# Patient Record
Sex: FEMALE | Race: WHITE | NOT HISPANIC OR LATINO | Employment: OTHER | URBAN - METROPOLITAN AREA
[De-identification: names, ages, dates, MRNs, and addresses within clinical notes are randomized per-mention and may not be internally consistent; named-entity substitution may affect disease eponyms.]

---

## 2017-09-05 ENCOUNTER — TELEPHONE (OUTPATIENT)
Dept: NEUROSURGERY | Facility: CLINIC | Age: 62
End: 2017-09-05

## 2017-09-05 NOTE — TELEPHONE ENCOUNTER
Patient had a fall and complains of hand numbness.  She as not been seen in out office since 2016. I told her that the soonest we could get her in was early October. I advise her to see her PCP to get imaging.     She would like a call from you about this.     [phone]

## 2017-09-05 NOTE — TELEPHONE ENCOUNTER
"Patient called back again and requested an appointment with Dr. Ascencio, I said well you just saw Dr. Linn last year the physician's do not typically switch back and forth with their patients. I told her that her message was sent to our manager who she requested to speak with. She said ok. She is upset that we do not have any appointments. She said this is an emergency, I advised her if she is that concerned with her pain and numbness she needs to either contact her PCP, go to an urgent care or ER for an evaluation. Her response was \" What good are they going to do? Just order imaging? I advised they can see her and do an evaluation and then she can follow up with Dr. Linn with the imaging that they order so that Dr. Linn is able to give her more advise based on her images or scans. She finally said ok and hung up.  "

## 2017-11-10 ENCOUNTER — TELEPHONE (OUTPATIENT)
Dept: NEUROSURGERY | Facility: CLINIC | Age: 62
End: 2017-11-10

## 2017-11-10 NOTE — TELEPHONE ENCOUNTER
Called this patient because has already seen Dr. Linn on 9/28/2016 however she is now scheduled with Dr. Ascencio on 11/14/2017 as a new patient and she should not be scheduled with both physician's. Per Manav the patient is to be cancelled and can be rescheduled with Dr. Linn. We also have a telephone conversation from 9/5/2017 explaining the same stuff basically that she can't see both physicians. But then somehow either she or her physicians office called back on 9/19/2017 and she was scheduled with Dr. Ascencio. The one phone number we had listed as a home was missing or invalid and the other number we had as a mobile was the wrong number. I called her daughter and was given her new cell phone number. I have left the patient a voicemail and her appt. For Tuesday 11/14/2017 with Dr. Ascencio has been cancelled.

## 2017-12-12 ENCOUNTER — APPOINTMENT (OUTPATIENT)
Dept: WOMENS IMAGING | Facility: HOSPITAL | Age: 62
End: 2017-12-12

## 2017-12-12 PROCEDURE — 77067 SCR MAMMO BI INCL CAD: CPT | Performed by: RADIOLOGY

## 2017-12-12 PROCEDURE — 77063 BREAST TOMOSYNTHESIS BI: CPT | Performed by: RADIOLOGY

## 2018-12-14 ENCOUNTER — APPOINTMENT (OUTPATIENT)
Dept: WOMENS IMAGING | Facility: HOSPITAL | Age: 63
End: 2018-12-14

## 2018-12-14 PROCEDURE — 77063 BREAST TOMOSYNTHESIS BI: CPT | Performed by: RADIOLOGY

## 2018-12-14 PROCEDURE — 77067 SCR MAMMO BI INCL CAD: CPT | Performed by: RADIOLOGY

## 2019-12-17 ENCOUNTER — APPOINTMENT (OUTPATIENT)
Dept: WOMENS IMAGING | Facility: HOSPITAL | Age: 64
End: 2019-12-17

## 2019-12-17 PROCEDURE — 77063 BREAST TOMOSYNTHESIS BI: CPT | Performed by: RADIOLOGY

## 2019-12-17 PROCEDURE — 77067 SCR MAMMO BI INCL CAD: CPT | Performed by: RADIOLOGY

## 2020-12-22 ENCOUNTER — APPOINTMENT (OUTPATIENT)
Dept: WOMENS IMAGING | Facility: HOSPITAL | Age: 65
End: 2020-12-22

## 2020-12-22 PROCEDURE — 77063 BREAST TOMOSYNTHESIS BI: CPT | Performed by: RADIOLOGY

## 2020-12-22 PROCEDURE — 77067 SCR MAMMO BI INCL CAD: CPT | Performed by: RADIOLOGY

## 2021-12-28 ENCOUNTER — APPOINTMENT (OUTPATIENT)
Dept: WOMENS IMAGING | Facility: HOSPITAL | Age: 66
End: 2021-12-28

## 2021-12-28 PROCEDURE — 77063 BREAST TOMOSYNTHESIS BI: CPT | Performed by: RADIOLOGY

## 2021-12-28 PROCEDURE — 77067 SCR MAMMO BI INCL CAD: CPT | Performed by: RADIOLOGY

## 2022-01-30 PROBLEM — R05.9 COUGH: Status: ACTIVE | Noted: 2022-01-30

## 2022-06-29 ENCOUNTER — PREP FOR SURGERY (OUTPATIENT)
Dept: SURGERY | Facility: SURGERY CENTER | Age: 67
End: 2022-06-29

## 2022-06-29 ENCOUNTER — OFFICE VISIT (OUTPATIENT)
Dept: GASTROENTEROLOGY | Facility: CLINIC | Age: 67
End: 2022-06-29

## 2022-06-29 DIAGNOSIS — K76.0 FATTY LIVER: ICD-10-CM

## 2022-06-29 DIAGNOSIS — Z12.11 ENCOUNTER FOR SCREENING FOR MALIGNANT NEOPLASM OF COLON: ICD-10-CM

## 2022-06-29 DIAGNOSIS — K21.9 GASTROESOPHAGEAL REFLUX DISEASE, UNSPECIFIED WHETHER ESOPHAGITIS PRESENT: ICD-10-CM

## 2022-06-29 DIAGNOSIS — R05.9 COUGH: Primary | ICD-10-CM

## 2022-06-29 DIAGNOSIS — R05.9 COUGH: ICD-10-CM

## 2022-06-29 DIAGNOSIS — R19.7 DIARRHEA, UNSPECIFIED TYPE: Primary | ICD-10-CM

## 2022-06-29 DIAGNOSIS — R19.7 DIARRHEA, UNSPECIFIED TYPE: ICD-10-CM

## 2022-06-29 PROCEDURE — 99204 OFFICE O/P NEW MOD 45 MIN: CPT | Performed by: INTERNAL MEDICINE

## 2022-06-29 RX ORDER — TRIAMCINOLONE ACETONIDE 1 MG/G
CREAM TOPICAL
COMMUNITY
Start: 2022-06-24

## 2022-06-29 RX ORDER — SACCHAROMYCES BOULARDII 250 MG
250 CAPSULE ORAL DAILY
COMMUNITY

## 2022-06-29 RX ORDER — SODIUM CHLORIDE, SODIUM LACTATE, POTASSIUM CHLORIDE, CALCIUM CHLORIDE 600; 310; 30; 20 MG/100ML; MG/100ML; MG/100ML; MG/100ML
30 INJECTION, SOLUTION INTRAVENOUS CONTINUOUS PRN
Status: CANCELLED | OUTPATIENT
Start: 2022-06-29

## 2022-06-29 RX ORDER — ROSUVASTATIN CALCIUM 10 MG/1
10 TABLET, COATED ORAL DAILY
COMMUNITY
Start: 2022-04-13

## 2022-06-29 RX ORDER — MAGNESIUM CARB/ALUMINUM HYDROX 105-160MG
TABLET,CHEWABLE ORAL
COMMUNITY
Start: 2022-05-16

## 2022-06-29 RX ORDER — SODIUM CHLORIDE 0.9 % (FLUSH) 0.9 %
3 SYRINGE (ML) INJECTION EVERY 12 HOURS SCHEDULED
Status: CANCELLED | OUTPATIENT
Start: 2022-06-29

## 2022-06-29 RX ORDER — LEVOTHYROXINE SODIUM 112 UG/1
112 TABLET ORAL EVERY MORNING
COMMUNITY
Start: 2022-06-03

## 2022-06-29 RX ORDER — ALPRAZOLAM 0.25 MG/1
TABLET ORAL
COMMUNITY
Start: 2022-05-24

## 2022-06-29 RX ORDER — TURMERIC ROOT EXTRACT 500 MG
500 TABLET ORAL DAILY
COMMUNITY

## 2022-06-29 RX ORDER — PANTOPRAZOLE SODIUM 40 MG/1
40 TABLET, DELAYED RELEASE ORAL DAILY
COMMUNITY
Start: 2022-04-22

## 2022-06-29 RX ORDER — DESVENLAFAXINE 100 MG/1
50 TABLET, EXTENDED RELEASE ORAL DAILY
COMMUNITY

## 2022-06-29 RX ORDER — TRAMADOL HYDROCHLORIDE 50 MG/1
50 TABLET ORAL EVERY 6 HOURS PRN
Status: ON HOLD | COMMUNITY
Start: 2022-05-27 | End: 2022-10-11

## 2022-06-29 RX ORDER — SODIUM CHLORIDE 0.9 % (FLUSH) 0.9 %
10 SYRINGE (ML) INJECTION AS NEEDED
Status: CANCELLED | OUTPATIENT
Start: 2022-06-29

## 2022-06-29 RX ORDER — LEVOCETIRIZINE DIHYDROCHLORIDE 5 MG/1
5 TABLET, FILM COATED ORAL DAILY
COMMUNITY
Start: 2022-02-02

## 2022-06-29 RX ORDER — VITAMIN E 268 MG
1 CAPSULE ORAL DAILY
COMMUNITY

## 2022-06-29 NOTE — PROGRESS NOTES
"Chief Complaint   Patient presents with   • Diarrhea           History of Present Illness  Patient is a 67 year old female who presents to the office as a new patient for evaluation.  She has a history of heartburn and diarrhea.     Reports last colonoscopy was \"years\" ago and revealed internal hemorrhoids.     She was prescribed pantoprazole 40 mg po once daily and describes heart burn as well controlled. States that at this time she also started taking Xyzal for a persistent cough that has since resolved.     At today's visit she reports that she has began taking the pantoprazole as needed with noticeable return in dry cough.     She presents to today for a one year history of diarrhea without melena or hematochezia.  Describes stool consistency as liquid and yellow in color.    Denies prior stool testing.           Review of Systems     Result Review :       US Gallbladder (02/01/2022 10:01)  LIPID PANEL (01/06/2022 14:17)  COMPREHENSIVE METABOLIC PANEL (01/06/2022 14:17)    Vital Signs:   There were no vitals taken for this visit.    There is no height or weight on file to calculate BMI.     Physical Exam  Vitals reviewed.   Constitutional:       Appearance: Normal appearance.   HENT:      Nose: No nasal deformity.   Eyes:      General: No scleral icterus.  Neck:      Comments: Trachea midline.  Cardiovascular:      Rate and Rhythm: Normal rate and regular rhythm.   Pulmonary:      Effort: No respiratory distress.      Breath sounds: Normal breath sounds.   Abdominal:      General: Bowel sounds are normal. There is no distension.      Palpations: Abdomen is soft. There is no mass.      Tenderness: There is no abdominal tenderness.   Lymphadenopathy:      Comments: No periumbilical lymphadenopathy.   Skin:     General: Skin is warm.   Neurological:      Mental Status: She is alert.           Assessment and Plan    Diagnoses and all orders for this visit:    1. Cough (Primary)    2. Fatty liver    3. Diarrhea, " unspecified type             I have reviewed and confirmed the accuracy of the HPI and Assessment and Plan as documented by the APRN GERALDINE Abraham        Follow Up   Return if symptoms worsen or fail to improve, for at procedure.    Patient Instructions   Continue taking Probiotic     Restart for Xyzal once daily for 14 days, then you May restart pantoprazole 40 mg once daily to help with determining if cough is related to acid reflux verses seasonal allergies     Blood work today to assess liver function     Once we receive these results, our office will contact you to discuss updating your treatment plan as indicated       Stool Testing for Diarrhea:    1. We will check stool studies to assess for any infectious etiology that could be contributing to your symptoms.   2. Stool Specimens kit will be given today by the lab  If you cannot return stool specimens to lab within 24 hours, place specimen in provided bag with kit and keep in refrigerator until you are able to drop off specimen  Our office will contact you once we receive these results to discuss updating treatment plan as indicated     To better evaluate diarrhea and cough/possible acid reflux:     Schedule EGD and colonoscopy, orders placed

## 2022-06-29 NOTE — PATIENT INSTRUCTIONS
Continue taking Probiotic     Restart for Xyzal once daily for 14 days, then you May restart pantoprazole 40 mg once daily to help with determining if cough is related to acid reflux verses seasonal allergies     Blood work today to assess liver function     Once we receive these results, our office will contact you to discuss updating your treatment plan as indicated       Stool Testing for Diarrhea:    We will check stool studies to assess for any infectious etiology that could be contributing to your symptoms.   Stool Specimens kit will be given today by the lab  If you cannot return stool specimens to lab within 24 hours, place specimen in provided bag with kit and keep in refrigerator until you are able to drop off specimen  Our office will contact you once we receive these results to discuss updating treatment plan as indicated     To better evaluate diarrhea and cough/possible acid reflux:     Schedule EGD and colonoscopy, orders placed

## 2022-06-30 LAB
ALBUMIN SERPL-MCNC: 5 G/DL (ref 3.8–4.8)
ALBUMIN/GLOB SERPL: 2.1 {RATIO} (ref 1.2–2.2)
ALP SERPL-CCNC: 126 IU/L (ref 44–121)
ALT SERPL-CCNC: 32 IU/L (ref 0–32)
AST SERPL-CCNC: 22 IU/L (ref 0–40)
BILIRUB SERPL-MCNC: 0.4 MG/DL (ref 0–1.2)
BUN SERPL-MCNC: 13 MG/DL (ref 8–27)
BUN/CREAT SERPL: 18 (ref 12–28)
CALCIUM SERPL-MCNC: 9.8 MG/DL (ref 8.7–10.3)
CHLORIDE SERPL-SCNC: 105 MMOL/L (ref 96–106)
CO2 SERPL-SCNC: 22 MMOL/L (ref 20–29)
CREAT SERPL-MCNC: 0.72 MG/DL (ref 0.57–1)
EGFRCR SERPLBLD CKD-EPI 2021: 91 ML/MIN/1.73
GLOBULIN SER CALC-MCNC: 2.4 G/DL (ref 1.5–4.5)
GLUCOSE SERPL-MCNC: 108 MG/DL (ref 65–99)
POTASSIUM SERPL-SCNC: 4.7 MMOL/L (ref 3.5–5.2)
PROT SERPL-MCNC: 7.4 G/DL (ref 6–8.5)
SODIUM SERPL-SCNC: 142 MMOL/L (ref 134–144)

## 2022-07-11 DIAGNOSIS — R79.89 ABNORMAL LIVER FUNCTION TESTS: Primary | ICD-10-CM

## 2022-07-11 LAB
ALP BONE CFR SERPL: 31 % (ref 14–68)
ALP INTEST CFR SERPL: 0 % (ref 0–18)
ALP LIVER CFR SERPL: 69 % (ref 18–85)
ALP SERPL-CCNC: 123 IU/L (ref 44–121)
THYROPEROXIDASE AB SERPL-ACNC: <8 IU/ML (ref 0–34)
WRITTEN AUTHORIZATION: NORMAL

## 2022-07-12 ENCOUNTER — LAB (OUTPATIENT)
Dept: LAB | Facility: HOSPITAL | Age: 67
End: 2022-07-12

## 2022-07-12 DIAGNOSIS — R79.89 ABNORMAL LIVER FUNCTION TESTS: ICD-10-CM

## 2022-07-12 LAB
BACTERIA SPEC CULT: NORMAL
BACTERIA SPEC CULT: NORMAL
C DIFF TOX GENS STL QL NAA+PROBE: NEGATIVE
CAMPYLOBACTER STL CULT: NORMAL
E COLI SXT STL QL IA: NEGATIVE
O+P SPEC MICRO: NORMAL
O+P STL CONC: NORMAL
SALM + SHIG STL CULT: NORMAL
SPECIMEN STATUS: NORMAL

## 2022-07-12 PROCEDURE — 36415 COLL VENOUS BLD VENIPUNCTURE: CPT

## 2022-07-12 PROCEDURE — 86376 MICROSOMAL ANTIBODY EACH: CPT

## 2022-07-13 LAB — LKM-1 AB SER-ACNC: 1.1 UNITS (ref 0–20)

## 2022-10-11 ENCOUNTER — ANESTHESIA EVENT (OUTPATIENT)
Dept: SURGERY | Facility: SURGERY CENTER | Age: 67
End: 2022-10-11

## 2022-10-11 ENCOUNTER — ANESTHESIA (OUTPATIENT)
Dept: SURGERY | Facility: SURGERY CENTER | Age: 67
End: 2022-10-11

## 2022-10-11 ENCOUNTER — HOSPITAL ENCOUNTER (OUTPATIENT)
Facility: SURGERY CENTER | Age: 67
Setting detail: HOSPITAL OUTPATIENT SURGERY
Discharge: HOME | End: 2022-10-11
Attending: INTERNAL MEDICINE | Admitting: INTERNAL MEDICINE

## 2022-10-11 VITALS
HEART RATE: 73 BPM | HEIGHT: 66 IN | OXYGEN SATURATION: 95 % | BODY MASS INDEX: 26.36 KG/M2 | RESPIRATION RATE: 17 BRPM | TEMPERATURE: 97.5 F | WEIGHT: 164 LBS | SYSTOLIC BLOOD PRESSURE: 138 MMHG | DIASTOLIC BLOOD PRESSURE: 91 MMHG

## 2022-10-11 DIAGNOSIS — K21.9 GASTROESOPHAGEAL REFLUX DISEASE, UNSPECIFIED WHETHER ESOPHAGITIS PRESENT: ICD-10-CM

## 2022-10-11 DIAGNOSIS — Z12.11 ENCOUNTER FOR SCREENING FOR MALIGNANT NEOPLASM OF COLON: ICD-10-CM

## 2022-10-11 DIAGNOSIS — R05.9 COUGH: ICD-10-CM

## 2022-10-11 DIAGNOSIS — R19.7 DIARRHEA, UNSPECIFIED TYPE: ICD-10-CM

## 2022-10-11 PROCEDURE — 87081 CULTURE SCREEN ONLY: CPT | Performed by: INTERNAL MEDICINE

## 2022-10-11 PROCEDURE — 25010000002 PROPOFOL 10 MG/ML EMULSION: Performed by: STUDENT IN AN ORGANIZED HEALTH CARE EDUCATION/TRAINING PROGRAM

## 2022-10-11 PROCEDURE — 43239 EGD BIOPSY SINGLE/MULTIPLE: CPT | Performed by: INTERNAL MEDICINE

## 2022-10-11 PROCEDURE — 45385 COLONOSCOPY W/LESION REMOVAL: CPT | Performed by: INTERNAL MEDICINE

## 2022-10-11 PROCEDURE — 88305 TISSUE EXAM BY PATHOLOGIST: CPT | Performed by: INTERNAL MEDICINE

## 2022-10-11 PROCEDURE — 45380 COLONOSCOPY AND BIOPSY: CPT | Performed by: INTERNAL MEDICINE

## 2022-10-11 RX ORDER — SUCRALFATE 1 G/1
1 TABLET ORAL 2 TIMES DAILY
Qty: 60 TABLET | Refills: 1 | Status: SHIPPED | OUTPATIENT
Start: 2022-10-11 | End: 2023-01-30

## 2022-10-11 RX ORDER — SODIUM CHLORIDE, SODIUM LACTATE, POTASSIUM CHLORIDE, CALCIUM CHLORIDE 600; 310; 30; 20 MG/100ML; MG/100ML; MG/100ML; MG/100ML
30 INJECTION, SOLUTION INTRAVENOUS CONTINUOUS PRN
Status: DISCONTINUED | OUTPATIENT
Start: 2022-10-11 | End: 2022-10-11 | Stop reason: HOSPADM

## 2022-10-11 RX ORDER — SODIUM CHLORIDE 0.9 % (FLUSH) 0.9 %
3 SYRINGE (ML) INJECTION EVERY 12 HOURS SCHEDULED
Status: DISCONTINUED | OUTPATIENT
Start: 2022-10-11 | End: 2022-10-11 | Stop reason: HOSPADM

## 2022-10-11 RX ORDER — MAGNESIUM HYDROXIDE 1200 MG/15ML
LIQUID ORAL AS NEEDED
Status: DISCONTINUED | OUTPATIENT
Start: 2022-10-11 | End: 2022-10-11 | Stop reason: HOSPADM

## 2022-10-11 RX ORDER — SODIUM CHLORIDE 0.9 % (FLUSH) 0.9 %
10 SYRINGE (ML) INJECTION AS NEEDED
Status: DISCONTINUED | OUTPATIENT
Start: 2022-10-11 | End: 2022-10-11 | Stop reason: HOSPADM

## 2022-10-11 RX ORDER — PROPOFOL 10 MG/ML
VIAL (ML) INTRAVENOUS AS NEEDED
Status: DISCONTINUED | OUTPATIENT
Start: 2022-10-11 | End: 2022-10-11 | Stop reason: SURG

## 2022-10-11 RX ORDER — LIDOCAINE HYDROCHLORIDE 20 MG/ML
INJECTION, SOLUTION INFILTRATION; PERINEURAL AS NEEDED
Status: DISCONTINUED | OUTPATIENT
Start: 2022-10-11 | End: 2022-10-11 | Stop reason: SURG

## 2022-10-11 RX ADMIN — PROPOFOL INJECTABLE EMULSION 120 MG: 10 INJECTION, EMULSION INTRAVENOUS at 10:24

## 2022-10-11 RX ADMIN — LIDOCAINE HYDROCHLORIDE 60 MG: 20 INJECTION, SOLUTION INFILTRATION; PERINEURAL at 10:24

## 2022-10-11 RX ADMIN — PROPOFOL INJECTABLE EMULSION 180 MCG/KG/MIN: 10 INJECTION, EMULSION INTRAVENOUS at 10:25

## 2022-10-11 RX ADMIN — SODIUM CHLORIDE, POTASSIUM CHLORIDE, SODIUM LACTATE AND CALCIUM CHLORIDE 30 ML/HR: 600; 310; 30; 20 INJECTION, SOLUTION INTRAVENOUS at 10:01

## 2022-10-11 NOTE — H&P
No chief complaint on file.      HPI  Diarrhea  gerd  Cough  Rectal bleeding           Problem List:    Patient Active Problem List   Diagnosis   • Degeneration of intervertebral disc of cervical region   • Bilateral occipital neuralgia   • Cough   • Diarrhea   • Gastroesophageal reflux disease   • Encounter for screening for malignant neoplasm of colon       Medical History:    Past Medical History:   Diagnosis Date   • Depression    • Graves disease    • Hyperlipidemia    • Insomnia         Social History:    Social History     Socioeconomic History   • Marital status:    • Number of children: 3   • Years of education: some college   Tobacco Use   • Smoking status: Never   • Smokeless tobacco: Never   Vaping Use   • Vaping Use: Never used   Substance and Sexual Activity   • Alcohol use: Yes     Comment: 1-2 per week   • Drug use: No   • Sexual activity: Defer     Birth control/protection: Post-menopausal       Family History:   Family History   Problem Relation Age of Onset   • Stroke Mother    • Cancer Father    • Cancer Other    • Colon cancer Neg Hx    • Colon polyps Neg Hx    • Crohn's disease Neg Hx    • Irritable bowel syndrome Neg Hx    • Ulcerative colitis Neg Hx        Surgical History:   Past Surgical History:   Procedure Laterality Date   • COLONOSCOPY     • KNEE SURGERY     • NECK SURGERY     • UPPER GASTROINTESTINAL ENDOSCOPY         No current facility-administered medications for this encounter.    Current Outpatient Medications:   •  acetaminophen-codeine (TYLENOL #3) 300-30 MG per tablet, Take 1 tablet by mouth Every 4 (Four) Hours As Needed for Moderate Pain ., Disp: , Rfl:   •  albuterol (PROVENTIL HFA;VENTOLIN HFA) 108 (90 Base) MCG/ACT inhaler, Inhale 2 puffs Every 6 (Six) Hours As Needed for Wheezing or Shortness of Air., Disp: 1 inhaler, Rfl: 0  •  ALPRAZolam (XANAX) 0.25 MG tablet, , Disp: , Rfl:   •  benzonatate (TESSALON) 100 MG capsule, Take 2 capsules by mouth 3 (Three) Times a  Day As Needed for Cough., Disp: 10 capsule, Rfl: 0  •  benzonatate (TESSALON) 200 MG capsule, Take 1 capsule by mouth 3 (Three) Times a Day As Needed for Cough., Disp: 30 capsule, Rfl: 0  •  Biotin 10 MG capsule, Take 10 mg by mouth Daily., Disp: , Rfl:   •  Chlorcyclizine-Pseudoephed (STAHIST AD) 25-60 MG tablet, 1 tablet 3 times a day as needed for congestion, Disp: 30 tablet, Rfl: 0  •  Cholecalciferol (VITAMIN D) 2000 UNITS capsule, Take  by mouth., Disp: , Rfl:   •  Cholecalciferol 50 MCG (2000 UT) tablet, Take 2,000 Units by mouth Daily., Disp: , Rfl:   •  desvenlafaxine (PRISTIQ) 100 MG 24 hr tablet, Take 50 mg by mouth Daily., Disp: , Rfl:   •  fluticasone (FLONASE) 50 MCG/ACT nasal spray, 2 sprays into the nostril(s) as directed by provider Daily., Disp: 1 bottle, Rfl: 0  •  gabapentin (NEURONTIN) 300 MG capsule, , Disp: , Rfl:   •  Glucosamine-Chondroit-Vit C-Mn (GLUCOSAMINE-CHONDROITIN) tablet, Take  by mouth., Disp: , Rfl:   •  levocetirizine (XYZAL) 5 MG tablet, Take 5 mg by mouth Daily., Disp: , Rfl:   •  levothyroxine (SYNTHROID, LEVOTHROID) 112 MCG tablet, Take 112 mcg by mouth Every Morning., Disp: , Rfl:   •  levothyroxine sodium (TIROSINT) 125 MCG capsule capsule, Take  by mouth., Disp: , Rfl:   •  magnesium citrate 1.745 GM/30ML solution solution, , Disp: , Rfl:   •  MethylPREDNISolone (MEDROL, PORSCHE,) 4 MG tablet, follow package directions, Disp: 21 tablet, Rfl: 0  •  Multiple Vitamins-Minerals (PX MENS MULTIVITAMINS) tablet, Take  by mouth., Disp: , Rfl:   •  niacin 500 MG tablet, Take  by mouth., Disp: , Rfl:   •  pantoprazole (PROTONIX) 40 MG EC tablet, Take 40 mg by mouth Daily., Disp: , Rfl:   •  REXULTI 4 MG tablet, Take  by mouth Daily., Disp: , Rfl: 2  •  rosuvastatin (CRESTOR) 10 MG tablet, Take 10 mg by mouth Daily., Disp: , Rfl:   •  saccharomyces boulardii (FLORASTOR) 250 MG capsule, Take 250 mg by mouth Daily., Disp: , Rfl:   •  tobramycin 0.3 % solution ophthalmic solution, Use 2  drops in affected eye every two hours the first day then 2 drops 4 x's a day for 6 days., Disp: 1 bottle, Rfl: 0  •  traMADol (ULTRAM) 50 MG tablet, Take 50 mg by mouth Every 6 (Six) Hours As Needed., Disp: , Rfl:   •  triamcinolone (KENALOG) 0.1 % cream, , Disp: , Rfl:   •  Turmeric 500 MG tablet, Take 500 mg by mouth Daily., Disp: , Rfl:   •  vitamin E 400 UNIT capsule, Take 1 capsule by mouth Daily., Disp: , Rfl:   •  Vitamin E 400 UNITS tablet, Take  by mouth., Disp: , Rfl:   •  zolpidem (AMBIEN) 10 MG tablet, Take  by mouth., Disp: , Rfl:     Allergies:   Allergies   Allergen Reactions   • Codeine Itching, Nausea And Vomiting and GI Intolerance        The following portions of the patient's history were reviewed by me and updated as appropriate: review of systems, allergies, current medications, past family history, past medical history, past social history, past surgical history and problem list.    There were no vitals filed for this visit.    PHYSICAL EXAM:    CONSTITUTIONAL:  today's vital signs reviewed by me  GASTROINTESTINAL: abdomen is soft nontender nondistended with normal active bowel sounds, no masses are appreciated    Assessment/ Plan  Diarrhea  gerd  Cough    egd and colonoscopy    Risks and benefits as well as alternatives to endoscopic evaluation were explained to the patient and they voiced understanding and wish to proceed.  These risks include but are not limited to the risk of bleeding, perforation, adverse reaction to sedation, and missed lesions.  The patient was given the opportunity to ask questions prior to the endoscopic procedure.

## 2022-10-11 NOTE — ANESTHESIA POSTPROCEDURE EVALUATION
Patient: Marga Holcomb    Procedure Summary     Date: 10/11/22 Room / Location: [facility]  / [facility]    Anesthesia Start: 1019 Anesthesia Stop: 1102    Procedures:       ESOPHAGOGASTRODUODENOSCOPY      COLONOSCOPY FOR SCREENING Diagnosis:       Diarrhea, unspecified type      Cough      Gastroesophageal reflux disease, unspecified whether esophagitis present      Encounter for screening for malignant neoplasm of colon      (Diarrhea, unspecified type [R19.7])      (Cough [R05.9])      (Gastroesophageal reflux disease, unspecified whether esophagitis present [K21.9])      (Encounter for screening for malignant neoplasm of colon [Z12.11])    Surgeons: Malik Rowe MD Provider: Luis Enrique Sears MD    Anesthesia Type: MAC ASA Status: 2          Anesthesia Type: MAC    Vitals  Vitals Value Taken Time   /91 10/11/22 1122   Temp 36.4 °C (97.5 °F) 10/11/22 1106   Pulse 73 10/11/22 1120   Resp 18 10/11/22 1106   SpO2 95 % 10/11/22 1122   Vitals shown include unvalidated device data.        Post Anesthesia Care and Evaluation    Patient location during evaluation: bedside  Patient participation: complete - patient participated  Level of consciousness: awake and alert  Pain management: adequate    Airway patency: patent  Anesthetic complications: No anesthetic complications  PONV Status: controlled  Cardiovascular status: blood pressure returned to baseline and acceptable  Respiratory status: acceptable  Hydration status: acceptable

## 2022-10-11 NOTE — DISCHARGE INSTRUCTIONS
Monitored Anesthesia Care, Care After  These instructions provide you with information about caring for yourself after your procedure. Your health care provider may also give you more specific instructions. Your treatment has been planned according to current medical practices, but problems sometimes occur. Call your health care provider if you have any problems or questions after your procedure.  What can I expect after the procedure?  After your procedure, you may:  Feel sleepy for several hours.  Feel clumsy and have poor balance for several hours.  Feel forgetful about what happened after the procedure.  Have poor judgment for several hours.  Feel nauseous or vomit.  Have a sore throat if you had a breathing tube during the procedure.  Follow these instructions at home:    *****************NO DRIVING TODAY****************************    For at least 24 hours after the procedure:             Have a responsible adult stay with you. It is important to have someone help care for you until you are awake and alert.  Rest as needed.  Do not:  Participate in activities in which you could fall or become injured.  Use heavy machinery.  Drink alcohol.  Take sleeping pills or medicines that cause drowsiness.  Make important decisions or sign legal documents.  Take care of children on your own.  Eating and drinking  Follow the diet that is recommended by your health care provider.  If you vomit, drink water, juice, or soup when you can drink without vomiting.  Make sure you have little or no nausea before eating solid foods.  General instructions  Take over-the-counter and prescription medicines only as told by your health care provider.  If you have sleep apnea, surgery and certain medicines can increase your risk for breathing problems. Follow instructions from your health care provider about wearing your sleep device:  Anytime you are sleeping, including during daytime naps.  While taking prescription pain medicines,  sleeping medicines, or medicines that make you drowsy.  If you smoke, do not smoke without supervision.  Keep all follow-up visits as told by your health care provider. This is important.  Contact a health care provider if:  You keep feeling nauseous or you keep vomiting.  You feel light-headed.  You develop a rash.  You have a fever.  Get help right away if:  You have trouble breathing.  Summary  For several hours after your procedure, you may feel sleepy and have poor judgment.  Have a responsible adult stay with you for at least 24 hours or until you are awake and alert.  This information is not intended to replace advice given to you by your health care provider. Make sure you discuss any questions you have with your health care provider.  Document Released: 04/09/2017 Document Revised: 08/03/2018 Document Reviewed: 04/09/2017  [employer] Patient Education © 2019 [employer]    ENDOSCOPY - EGD/COLONOSCOPY       ADULT CARE DISCHARGE  INSTRUCTIONS     Symptoms you may temporarily experience:      Sore Throat     Hoarseness     Bloating/Cramping     Dizziness     IV Irritation/tenderness     Gas or Belching     Slight fever     Small amount of blood in vomit or stool       Call Your Doctor for the following Problems: ______________________     ________________     Fever of 101 degrees or higher       Sharp abdominal  pain     Red streak up the arm from the IV site     Severe cramping        Large amount of blood in stool or vomit       Instructions for the next 24 hours after your Procedure:     Adult supervision     Do NOT drink any alcohol      Do not work today     NO important decisions     DO NOT sign any legal documents     You may shower/ bathe       DO NOT  DRIVE or operate machinery     Resume normal activity tomorrow       Discharge  Diet:     Avoid spicy/ greasy foods     Avoid any food that will cause more gas or bloating       *** Seek IMMEDIATE medical attention and call 911 if you develop  symptoms such as:     Chest pain     Shortness of breath     Severe bleeding

## 2022-10-11 NOTE — ANESTHESIA PREPROCEDURE EVALUATION
Anesthesia Evaluation     Patient summary reviewed and Nursing notes reviewed   no history of anesthetic complications:  NPO Solid Status: > 8 hours  NPO Liquid Status: > 2 hours           Airway   Mallampati: II  TM distance: >3 FB  Neck ROM: full  Dental      Pulmonary    Cardiovascular     (+) hyperlipidemia,       Neuro/Psych  GI/Hepatic/Renal/Endo    (+)  GERD,  thyroid problem hypothyroidism    Musculoskeletal     Abdominal    Substance History      OB/GYN          Other                      Anesthesia Plan    ASA 2     MAC     intravenous induction     Anesthetic plan, risks, benefits, and alternatives have been provided, discussed and informed consent has been obtained with: patient.        CODE STATUS:

## 2022-10-13 LAB — UREASE TISS QL: NEGATIVE

## 2022-10-14 LAB
LAB AP CASE REPORT: NORMAL
LAB AP CLINICAL INFORMATION: NORMAL
LAB AP DIAGNOSIS COMMENT: NORMAL
PATH REPORT.FINAL DX SPEC: NORMAL
PATH REPORT.GROSS SPEC: NORMAL

## 2022-11-02 ENCOUNTER — TELEPHONE (OUTPATIENT)
Dept: GASTROENTEROLOGY | Facility: CLINIC | Age: 67
End: 2022-11-02

## 2022-11-02 NOTE — TELEPHONE ENCOUNTER
Patient called about directions for her medication sucralfate 1 g tablet. She stated the directions on the bottle said not to take 2 hours prior to anoter medication and she is having problems with finding time to take it among all her other medications. When I checked the chart it just states to take it twice daily. Attempted to call back to clarify but on a VM, [facility] and gave her directions as stated in the chart BID.

## 2022-11-02 NOTE — TELEPHONE ENCOUNTER
Please let patient know that she can take it twice daily if this will help avoid all of her medications.  Taking sucralfate at the same time as her other medications could decrease the other medications absorption and reduce their ability to work.    GERALDINE Abraham

## 2023-01-11 ENCOUNTER — OFFICE VISIT (OUTPATIENT)
Dept: GASTROENTEROLOGY | Facility: CLINIC | Age: 68
End: 2023-01-11
Payer: MEDICARE

## 2023-01-11 ENCOUNTER — PREP FOR SURGERY (OUTPATIENT)
Dept: SURGERY | Facility: SURGERY CENTER | Age: 68
End: 2023-01-11
Payer: MEDICARE

## 2023-01-11 VITALS
OXYGEN SATURATION: 96 % | HEART RATE: 77 BPM | DIASTOLIC BLOOD PRESSURE: 80 MMHG | HEIGHT: 66 IN | SYSTOLIC BLOOD PRESSURE: 130 MMHG | WEIGHT: 171.6 LBS | TEMPERATURE: 97.8 F | BODY MASS INDEX: 27.58 KG/M2

## 2023-01-11 DIAGNOSIS — R05.9 COUGH, UNSPECIFIED TYPE: Primary | ICD-10-CM

## 2023-01-11 DIAGNOSIS — K22.70 BARRETT'S ESOPHAGUS WITHOUT DYSPLASIA: ICD-10-CM

## 2023-01-11 DIAGNOSIS — K22.70 BARRETT'S ESOPHAGUS WITHOUT DYSPLASIA: Primary | ICD-10-CM

## 2023-01-11 DIAGNOSIS — K21.9 GASTROESOPHAGEAL REFLUX DISEASE, UNSPECIFIED WHETHER ESOPHAGITIS PRESENT: ICD-10-CM

## 2023-01-11 DIAGNOSIS — K21.00 GASTROESOPHAGEAL REFLUX DISEASE WITH ESOPHAGITIS WITHOUT HEMORRHAGE: ICD-10-CM

## 2023-01-11 PROCEDURE — 99214 OFFICE O/P EST MOD 30 MIN: CPT

## 2023-01-11 RX ORDER — SODIUM CHLORIDE 0.9 % (FLUSH) 0.9 %
10 SYRINGE (ML) INJECTION AS NEEDED
Status: CANCELLED | OUTPATIENT
Start: 2023-01-11

## 2023-01-11 RX ORDER — SODIUM CHLORIDE, SODIUM LACTATE, POTASSIUM CHLORIDE, CALCIUM CHLORIDE 600; 310; 30; 20 MG/100ML; MG/100ML; MG/100ML; MG/100ML
30 INJECTION, SOLUTION INTRAVENOUS CONTINUOUS PRN
Status: CANCELLED | OUTPATIENT
Start: 2023-01-11

## 2023-01-11 RX ORDER — HYDROXYZINE HYDROCHLORIDE 10 MG/1
10 TABLET, FILM COATED ORAL EVERY 8 HOURS PRN
COMMUNITY
Start: 2022-10-18

## 2023-01-11 RX ORDER — SODIUM CHLORIDE 0.9 % (FLUSH) 0.9 %
3 SYRINGE (ML) INJECTION EVERY 12 HOURS SCHEDULED
Status: CANCELLED | OUTPATIENT
Start: 2023-01-11

## 2023-01-11 NOTE — PROGRESS NOTES
Chief Complaint   Patient presents with   • Heartburn           History of Present Illness    Patient is a 67 y.o. who presents to the office for follow up evaluation.  Last in office visit was on 6/29/22 with Dr. Rowe for further evaluation of chronic cough  .  Patient has a significant past medical history of elevated alkaline phosphatase heartburn and diarrhea- 7/6/22 stool testing negative for infectious etiology to diarrhea.    Most recent EGD and colonoscopy on 10/11/2022 with Dr. Rowe remarkable for LA grade C esophagitis, 1 cm hiatal hernia, gastritis, unremarkable duodenum.  Biopsies positive for Narvaez's esophagus without dysplasia, no evidence of H. pylori or celiac disease.    Colonoscopy revealed diverticulosis in the sigmoid and descending colon, 12 mm hyperplastic polyp at the hepatic flexure, 3 mm hyperplastic polyp in the descending colon, nonbleeding internal hemorrhoids.  Biopsies negative for microscopic colitis.  Recommend next colonoscopy for colon cancer screening in 3-5 years.    She presents today for follow up evaluation and discussion of EGD and CLS  Results     For GERD, she continues on pantoprazole 40 mg once daily approximately 30 to 60 minutes prior to bedtime.  Additionally, she continues with Carafate 1 g 2-3 times daily prior to meals.  Describes symptoms as overall well controlled and significantly improved since initiating acid reducing regimen.    Verbalizes concern over timing requirements of Carafate with daily medications.    Describes bowel habits as regular without evidence of melena hematochezia.    Reports only change in health history is chronic pruritus which is being followed by dermatology and has plans to meet with allergist to discuss future treatment plans.     Result Review :         Office Visit with Malik Rowe MD (06/29/2022)  Anti-microsomal Antibody (07/12/2022 14:31)  Tissue Pathology Exam (10/11/2022 10:32)  UPPER GI ENDOSCOPY (10/11/2022  "10:15)  COLONOSCOPY (10/11/2022 10:15)    Vital Signs:   /80   Pulse 77   Temp 97.8 °F (36.6 °C)   Ht 167.6 cm (66\")   Wt 77.8 kg (171 lb 9.6 oz)   SpO2 96%   BMI 27.70 kg/m²     Body mass index is 27.7 kg/m².     Physical Exam  Vitals reviewed.   Constitutional:       General: She is not in acute distress.     Appearance: She is well-developed.   HENT:      Head: Normocephalic and atraumatic.   Eyes:      General: No scleral icterus.  Pulmonary:      Effort: Pulmonary effort is normal. No respiratory distress.   Abdominal:      General: Abdomen is flat. Bowel sounds are normal. There is no distension.      Palpations: Abdomen is soft. There is no mass.      Tenderness: There is no abdominal tenderness. There is no guarding or rebound.      Hernia: No hernia is present.   Skin:     General: Skin is dry.      Coloration: Skin is not jaundiced or pale.      Findings: Rash present.      Comments: Diffuse rash in varying stages of healing without drainage or erythema.   Neurological:      Mental Status: She is alert and oriented to person, place, and time.   Psychiatric:         Thought Content: Thought content normal.           Assessment and Plan    Diagnoses and all orders for this visit:    1. Cough, unspecified type (Primary)    2. 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 Gastroesophageal reflux disease with esophagitis without hemorrhage           Discussion:    Patient is a pleasant 67 y.o.  who presents to the office for follow up evaluation.  Reviewed results of EGD and colonoscopy evaluation at length with patient.    Also discussed medication timing to avoid potential reduced absorption of maintenance medications at length with patient with written schedule provided.    Informed patient of associated long-term risk with PPI therapy including but not limited to vitamin deficiencies that include iron, magnesium, B12 as well as risk of gastric polyps, C. difficile.    For GERD we will continue " patient on current acid regimen with plans for surveillance EGD for Narvaez's esophagus in October 2023.  Orders placed today.  Recall in EMR.    We will plan for next colon cancer screening in June 2026.  Recall in EMR.    Patient is agreeable to the outlined above treatment plan.  Verbalizes understanding and will contact office for any new or worsening concerns.  All questions answered and support provided.        Patient Instructions   EGD due 10/2023 for surveillance of Barretts Esophagus    Continue pantoprazole 40 mg once daily approx 30-60 minutes prior to breakfast    Continue Sucralfate 1 G twice daily approximately 30 minutes prior to meals:    May dissolve:  Place the pill in 1 inch of water and do not eat or drink for 30 minutes afterward.   Do not take regularly scheduled medications along with this medication as it can reduce absorption of other medications      Next colonoscopy due October 2025-2027          EMR Dragon/Transcription Disclaimer:  This document has been Dictated utilizing Dragon dictation.

## 2023-01-11 NOTE — PATIENT INSTRUCTIONS
EGD due 10/2023 for surveillance of Barretts Esophagus    Continue pantoprazole 40 mg once daily approx 30-60 minutes prior to breakfast    Continue Sucralfate 1 G twice daily approximately 30 minutes prior to meals:    May dissolve:  Place the pill in 1 inch of water and do not eat or drink for 30 minutes afterward.   Do not take regularly scheduled medications along with this medication as it can reduce absorption of other medications      Next colonoscopy due October 2025-2027

## 2023-01-30 RX ORDER — SUCRALFATE 1 G/1
TABLET ORAL
Qty: 60 TABLET | Refills: 0 | Status: SHIPPED | OUTPATIENT
Start: 2023-01-30 | End: 2023-03-31

## 2023-03-31 RX ORDER — SUCRALFATE 1 G/1
TABLET ORAL
Qty: 60 TABLET | Refills: 0 | Status: SHIPPED | OUTPATIENT
Start: 2023-03-31

## 2023-05-10 RX ORDER — SUCRALFATE 1 G/1
TABLET ORAL
Qty: 60 TABLET | Refills: 0 | Status: SHIPPED | OUTPATIENT
Start: 2023-05-10

## 2023-07-11 PROBLEM — R11.0 NAUSEA: Status: ACTIVE | Noted: 2023-07-11

## 2023-09-28 ENCOUNTER — TELEPHONE (OUTPATIENT)
Dept: GASTROENTEROLOGY | Facility: CLINIC | Age: 68
End: 2023-09-28
Payer: MEDICARE

## 2023-09-28 NOTE — TELEPHONE ENCOUNTER
Hub staff attempted to follow warm transfer process and was unsuccessful     Caller: Marga Holcomb    Relationship to patient: Self    Best call back number: [phone]    Patient is needing: PT IS REQUESTING A CALLBACK TO SEE HOW LONG HER PROCEDURE ON 10/3/2023 WILL BE BECAUSE PT IS NEEDING TO LET HER RIDE KNOW.

## 2023-10-03 ENCOUNTER — ANESTHESIA (OUTPATIENT)
Dept: SURGERY | Facility: SURGERY CENTER | Age: 68
End: 2023-10-03
Payer: MEDICARE

## 2023-10-03 ENCOUNTER — HOSPITAL ENCOUNTER (OUTPATIENT)
Facility: SURGERY CENTER | Age: 68
Setting detail: HOSPITAL OUTPATIENT SURGERY
Discharge: HOME | End: 2023-10-03
Attending: INTERNAL MEDICINE | Admitting: INTERNAL MEDICINE
Payer: MEDICARE

## 2023-10-03 ENCOUNTER — ANESTHESIA EVENT (OUTPATIENT)
Dept: SURGERY | Facility: SURGERY CENTER | Age: 68
End: 2023-10-03
Payer: MEDICARE

## 2023-10-03 VITALS
TEMPERATURE: 98 F | OXYGEN SATURATION: 96 % | SYSTOLIC BLOOD PRESSURE: 120 MMHG | HEART RATE: 70 BPM | DIASTOLIC BLOOD PRESSURE: 74 MMHG | BODY MASS INDEX: 26.36 KG/M2 | HEIGHT: 66 IN | WEIGHT: 164 LBS | RESPIRATION RATE: 16 BRPM

## 2023-10-03 DIAGNOSIS — K21.9 GASTROESOPHAGEAL REFLUX DISEASE, UNSPECIFIED WHETHER ESOPHAGITIS PRESENT: ICD-10-CM

## 2023-10-03 DIAGNOSIS — K22.70 BARRETT'S ESOPHAGUS WITHOUT DYSPLASIA: ICD-10-CM

## 2023-10-03 PROCEDURE — 25810000003 LACTATED RINGERS PER 1000 ML: Performed by: INTERNAL MEDICINE

## 2023-10-03 PROCEDURE — 25010000002 PROPOFOL 10 MG/ML EMULSION: Performed by: STUDENT IN AN ORGANIZED HEALTH CARE EDUCATION/TRAINING PROGRAM

## 2023-10-03 PROCEDURE — 88305 TISSUE EXAM BY PATHOLOGIST: CPT | Performed by: INTERNAL MEDICINE

## 2023-10-03 PROCEDURE — 43239 EGD BIOPSY SINGLE/MULTIPLE: CPT | Performed by: INTERNAL MEDICINE

## 2023-10-03 PROCEDURE — 25010000002 PROPOFOL 1000 MG/100ML EMULSION: Performed by: STUDENT IN AN ORGANIZED HEALTH CARE EDUCATION/TRAINING PROGRAM

## 2023-10-03 RX ORDER — SODIUM CHLORIDE, SODIUM LACTATE, POTASSIUM CHLORIDE, CALCIUM CHLORIDE 600; 310; 30; 20 MG/100ML; MG/100ML; MG/100ML; MG/100ML
1000 INJECTION, SOLUTION INTRAVENOUS CONTINUOUS
Status: DISCONTINUED | OUTPATIENT
Start: 2023-10-03 | End: 2023-10-03 | Stop reason: HOSPADM

## 2023-10-03 RX ORDER — SODIUM CHLORIDE 0.9 % (FLUSH) 0.9 %
10 SYRINGE (ML) INJECTION AS NEEDED
Status: DISCONTINUED | OUTPATIENT
Start: 2023-10-03 | End: 2023-10-03 | Stop reason: HOSPADM

## 2023-10-03 RX ORDER — LIDOCAINE HYDROCHLORIDE 10 MG/ML
0.5 INJECTION, SOLUTION INFILTRATION; PERINEURAL ONCE AS NEEDED
Status: DISCONTINUED | OUTPATIENT
Start: 2023-10-03 | End: 2023-10-03 | Stop reason: HOSPADM

## 2023-10-03 RX ORDER — PROPOFOL 10 MG/ML
INJECTION, EMULSION INTRAVENOUS AS NEEDED
Status: DISCONTINUED | OUTPATIENT
Start: 2023-10-03 | End: 2023-10-03 | Stop reason: SURG

## 2023-10-03 RX ORDER — LIDOCAINE HYDROCHLORIDE 20 MG/ML
INJECTION, SOLUTION INFILTRATION; PERINEURAL AS NEEDED
Status: DISCONTINUED | OUTPATIENT
Start: 2023-10-03 | End: 2023-10-03 | Stop reason: SURG

## 2023-10-03 RX ORDER — UPADACITINIB 15 MG/1
15 TABLET, EXTENDED RELEASE ORAL DAILY
COMMUNITY

## 2023-10-03 RX ADMIN — SODIUM CHLORIDE, POTASSIUM CHLORIDE, SODIUM LACTATE AND CALCIUM CHLORIDE 1000 ML: 600; 310; 30; 20 INJECTION, SOLUTION INTRAVENOUS at 10:21

## 2023-10-03 RX ADMIN — PROPOFOL 140 MG: 10 INJECTION, EMULSION INTRAVENOUS at 10:41

## 2023-10-03 RX ADMIN — LIDOCAINE HYDROCHLORIDE 40 MG: 20 INJECTION, SOLUTION INFILTRATION; PERINEURAL at 10:41

## 2023-10-03 RX ADMIN — PROPOFOL 160 MCG/KG/MIN: 10 INJECTION, EMULSION INTRAVENOUS at 10:41

## 2023-10-03 NOTE — ANESTHESIA PREPROCEDURE EVALUATION
Anesthesia Evaluation     Patient summary reviewed and Nursing notes reviewed   history of anesthetic complications:  PONV  NPO Solid Status: > 8 hours  NPO Liquid Status: > 2 hours           Airway   Mallampati: II  TM distance: >3 FB  Neck ROM: full  Dental - normal exam     Pulmonary    Cardiovascular     (+) hyperlipidemia      Neuro/Psych  GI/Hepatic/Renal/Endo    (+) GERD, thyroid problem hypothyroidism    Musculoskeletal     Abdominal    Substance History      OB/GYN          Other                        Anesthesia Plan    ASA 2     MAC   total IV anesthesia  (I have reviewed the patient's history with the patient and the chart, including all pertinent laboratory results and imaging. I have explained the risks of anesthesia including but not limited to dental damage, corneal abrasion, nerve injury, MI, stroke, and death. Questions asked and answered. Anesthetic plan discussed with patient and team as indicated. Patient expressed understanding of the above.  )    Anesthetic plan, risks, benefits, and alternatives have been provided, discussed and informed consent has been obtained with: patient.      CODE STATUS:

## 2023-10-03 NOTE — ANESTHESIA POSTPROCEDURE EVALUATION
"Patient: Marga Holcomb    Procedure Summary       Date: 10/03/23 Room / Location: [facility]    Anesthesia Start: 1035 Anesthesia Stop: 1056    Procedure: ESOPHAGOGASTRODUODENOSCOPY Diagnosis:       Narvaez's esophagus without dysplasia      Gastroesophageal reflux disease, unspecified whether esophagitis present      (Narvaez's esophagus without dysplasia [K22.70])      (Gastroesophageal reflux disease, unspecified whether esophagitis present [K21.9])    Surgeons: Malik Rowe MD Provider: Devante Chow MD    Anesthesia Type: MAC ASA Status: 2            Anesthesia Type: MAC    Vitals  No vitals data found for the desired time range.          Post Anesthesia Care and Evaluation    Patient location during evaluation: PACU  Patient participation: complete - patient participated  Level of consciousness: awake and alert  Pain management: adequate    Airway patency: patent  Anesthetic complications: No anesthetic complications  PONV Status: controlled  Cardiovascular status: acceptable and hemodynamically stable  Respiratory status: acceptable  Hydration status: acceptable    Comments: /84 (BP Location: Left arm, Patient Position: Sitting)   Pulse 77   Temp 36.3 °C (97.3 °F) (Temporal)   Resp 16   Ht 167.6 cm (66\")   Wt 74.4 kg (164 lb)   SpO2 95%   BMI 26.47 kg/m²     "

## 2023-10-03 NOTE — H&P
No chief complaint on file.      HPI  Barretts  Gerd  Fu esophagitis         Problem List:    Patient Active Problem List   Diagnosis    Degeneration of intervertebral disc of cervical region    Bilateral occipital neuralgia    Cough    Diarrhea    Gastroesophageal reflux disease    Encounter for screening for malignant neoplasm of colon    Narvaez's esophagus without dysplasia    Nausea       Medical History:    Past Medical History:   Diagnosis Date    Depression     Graves disease     Hyperlipidemia     Insomnia     PONV (postoperative nausea and vomiting)         Social History:    Social History     Socioeconomic History    Marital status:     Number of children: 3    Years of education: some college   Tobacco Use    Smoking status: Never    Smokeless tobacco: Never   Vaping Use    Vaping Use: Never used   Substance and Sexual Activity    Alcohol use: Yes     Comment: 1-2 per week    Drug use: No    Sexual activity: Defer     Birth control/protection: Post-menopausal       Family History:   Family History   Problem Relation Age of Onset    Stroke Mother     Cancer Father     Cancer Other     Colon cancer Neg Hx     Colon polyps Neg Hx     Crohn's disease Neg Hx     Irritable bowel syndrome Neg Hx     Ulcerative colitis Neg Hx        Surgical History:   Past Surgical History:   Procedure Laterality Date    COLONOSCOPY      COLONOSCOPY N/A 10/11/2022    Procedure: COLONOSCOPY FOR SCREENING;  Surgeon: Malik Rowe MD;  Location: [facility] MAIN OR;  Service: Gastroenterology;  Laterality: N/A;  diverticulosis hepatic flexure polyp hemorrhoids     ENDOSCOPY N/A 10/11/2022    Procedure: ESOPHAGOGASTRODUODENOSCOPY;  Surgeon: Malik Rowe MD;  Location: [facility] MAIN OR;  Service: Gastroenterology;  Laterality: N/A;  gastritis esophagitis     KNEE SURGERY      NECK SURGERY      UPPER GASTROINTESTINAL ENDOSCOPY         No current facility-administered medications for this encounter.    Current Outpatient  Medications:     albuterol (PROVENTIL HFA;VENTOLIN HFA) 108 (90 Base) MCG/ACT inhaler, Inhale 2 puffs Every 6 (Six) Hours As Needed for Wheezing or Shortness of Air., Disp: 1 inhaler, Rfl: 0    ALPRAZolam (XANAX) 0.25 MG tablet, , Disp: , Rfl:     Biotin 10 MG capsule, Take 10 mg by mouth Daily., Disp: , Rfl:     Cholecalciferol (VITAMIN D) 2000 UNITS capsule, Take  by mouth., Disp: , Rfl:     clindamycin (CLEOCIN T) 1 % lotion, 2 (Two) Times a Day., Disp: , Rfl:     desvenlafaxine (PRISTIQ) 100 MG 24 hr tablet, Take 50 mg by mouth Daily., Disp: , Rfl:     esomeprazole (nexIUM) 40 MG capsule, Take 1 capsule by mouth Daily., Disp: 90 capsule, Rfl: 3    famotidine (PEPCID) 40 MG tablet, Take 1 tablet by mouth Daily., Disp: 30 tablet, Rfl: 5    fluticasone (FLONASE) 50 MCG/ACT nasal spray, 2 sprays into the nostril(s) as directed by provider Daily., Disp: 1 bottle, Rfl: 0    gabapentin (NEURONTIN) 300 MG capsule, , Disp: , Rfl:     hydrOXYzine (ATARAX) 10 MG tablet, Take 1 tablet by mouth Every 8 (Eight) Hours As Needed., Disp: , Rfl:     levocetirizine (XYZAL) 5 MG tablet, Take 1 tablet by mouth Daily., Disp: , Rfl:     levothyroxine (SYNTHROID, LEVOTHROID) 112 MCG tablet, Take 1 tablet by mouth Every Morning., Disp: , Rfl:     magnesium citrate 1.745 GM/30ML solution solution, , Disp: , Rfl:     Multiple Vitamins-Minerals (PX MENS MULTIVITAMINS) tablet, Take  by mouth., Disp: , Rfl:     ondansetron ODT (ZOFRAN-ODT) 4 MG disintegrating tablet, Dissolve 1 tablet by mouth every 6 hours PRN nausea, vomiting, Disp: 20 tablet, Rfl: 1    rosuvastatin (CRESTOR) 10 MG tablet, Take 1 tablet by mouth Daily., Disp: , Rfl:     saccharomyces boulardii (FLORASTOR) 250 MG capsule, Take 1 capsule by mouth Daily., Disp: , Rfl:     tobramycin 0.3 % solution ophthalmic solution, Use 2 drops in affected eye every two hours the first day then 2 drops 4 x's a day for 6 days., Disp: 1 bottle, Rfl: 0    triamcinolone (KENALOG) 0.1 % cream, ,  Disp: , Rfl:     Turmeric 500 MG tablet, Take 500 mg by mouth Daily., Disp: , Rfl:     vitamin E 400 UNIT capsule, Take 1 capsule by mouth Daily., Disp: , Rfl:     zolpidem (AMBIEN) 10 MG tablet, Take  by mouth., Disp: , Rfl:     Allergies:   Allergies   Allergen Reactions    Codeine Itching, Nausea And Vomiting and GI Intolerance        The following portions of the patient's history were reviewed by me and updated as appropriate: review of systems, allergies, current medications, past family history, past medical history, past social history, past surgical history and problem list.    There were no vitals filed for this visit.    PHYSICAL EXAM:    CONSTITUTIONAL:  today's vital signs reviewed by me  GASTROINTESTINAL: abdomen is soft nontender nondistended with normal active bowel sounds, no masses are appreciated    Assessment/ Plan  Barretts  Gerd  Fu esophagitis    egd    Risks and benefits as well as alternatives to endoscopic evaluation were explained to the patient and they voiced understanding and wish to proceed.  These risks include but are not limited to the risk of bleeding, perforation, adverse reaction to sedation, and missed lesions.  The patient was given the opportunity to ask questions prior to the endoscopic procedure.

## 2023-10-04 LAB
LAB AP CASE REPORT: NORMAL
PATH REPORT.FINAL DX SPEC: NORMAL
PATH REPORT.GROSS SPEC: NORMAL

## 2023-10-11 ENCOUNTER — TELEPHONE (OUTPATIENT)
Dept: GASTROENTEROLOGY | Facility: CLINIC | Age: 68
End: 2023-10-11
Payer: MEDICARE

## 2023-10-11 NOTE — TELEPHONE ENCOUNTER
Patient returned call. Discussed results/recommendations from recent EGD, and she verbalized understanding. Questions answered and support provided. dewey

## 2024-04-02 ENCOUNTER — TELEPHONE (OUTPATIENT)
Dept: GASTROENTEROLOGY | Facility: CLINIC | Age: 69
End: 2024-04-02
Payer: MEDICARE

## 2024-04-02 NOTE — TELEPHONE ENCOUNTER
"Patient left a Voice Message asking when her next EGD or office appointment should be done.     Per chart   Malik Rowe MD  10/4/2023  9:18 AM EDT       (...)   No barretts but due to stomach path would reevaluate in one year with biopsies and then decide surveillance   (...)        Tissue Pathology Exam (10/03/2023 10:46)   Office Visit with Natalia Seay APRN (07/11/2023)     I spoke with patient, who states that her UGI S/Sx have been controled since last encounters with us. Her main concerns now are her skin condition (still looking for a final Dx) and recent diarrhea. She saw a provider that ordered some bloodwork and stool studies, also recommended her to start an \"elimination diet\" and later potentially be off PPIs and use Anti-H2 instead.     Patient will update us on this. She is aware of follow-up  EGD in October this year.     No actions from provider needed at this moment.     "

## 2024-04-09 ENCOUNTER — TELEPHONE (OUTPATIENT)
Dept: GASTROENTEROLOGY | Facility: CLINIC | Age: 69
End: 2024-04-09
Payer: MEDICARE

## 2024-04-09 NOTE — TELEPHONE ENCOUNTER
Patient has been on the elimination diet x 1 week.  Diarrhea and skin lesions no better.  Wonders what she needs to do now.

## 2024-04-12 ENCOUNTER — TELEPHONE (OUTPATIENT)
Dept: GASTROENTEROLOGY | Facility: CLINIC | Age: 69
End: 2024-04-12
Payer: MEDICARE

## 2024-04-12 NOTE — TELEPHONE ENCOUNTER
Hub staff attempted to follow warm transfer process and was unsuccessful     Caller: SAGE FRANCO    Relationship to patient: Provider    Best call back number: [phone]    Patient is needing: PROVIDER HAS NEW LAB RESULTS AND SOME QUESTIONS SHE WOULD LIKE TO GET OVER TO OUR OFFICE BEFORE PATIENT'S APPT ON 04/19/24. PLEASE REVIEW AND CONTACT TO ADVISE.

## 2024-04-19 ENCOUNTER — OFFICE VISIT (OUTPATIENT)
Dept: GASTROENTEROLOGY | Facility: CLINIC | Age: 69
End: 2024-04-19
Payer: MEDICARE

## 2024-04-19 VITALS
BODY MASS INDEX: 27 KG/M2 | DIASTOLIC BLOOD PRESSURE: 75 MMHG | HEIGHT: 66 IN | WEIGHT: 168 LBS | SYSTOLIC BLOOD PRESSURE: 120 MMHG | HEART RATE: 83 BPM | TEMPERATURE: 96.5 F | OXYGEN SATURATION: 98 %

## 2024-04-19 DIAGNOSIS — K22.70 BARRETT'S ESOPHAGUS WITHOUT DYSPLASIA: Primary | ICD-10-CM

## 2024-04-19 DIAGNOSIS — R74.8 ABNORMAL LEVELS OF OTHER SERUM ENZYMES: ICD-10-CM

## 2024-04-19 DIAGNOSIS — R94.5 ABNORMAL FINDING ON LIVER FUNCTION: ICD-10-CM

## 2024-04-19 DIAGNOSIS — K21.00 GASTROESOPHAGEAL REFLUX DISEASE WITH ESOPHAGITIS WITHOUT HEMORRHAGE: ICD-10-CM

## 2024-04-19 RX ORDER — PANTOPRAZOLE SODIUM 40 MG/1
40 TABLET, DELAYED RELEASE ORAL DAILY
COMMUNITY
Start: 2024-02-23

## 2024-04-19 RX ORDER — AMOXICILLIN 500 MG/1
500 CAPSULE ORAL DAILY PRN
COMMUNITY
Start: 2024-04-15

## 2024-04-19 NOTE — PATIENT INSTRUCTIONS
Blood work today     Once we receive these results, our office will contact you to discuss updating your treatment plan as indicated      For nausea, bloating and fullness     Isela has been helpful for some patients.  Gingerroot capsules can be purchased over-the-counter.    Directions: Isela root 500 mg (or 550 mg) capsules, take 1 to 2 capsules 3 times daily before meals, max 6 capsules per day.      Scheduling of your Ordered Diagnostic Tests MRI evaluation of liver/ pancreas :    A team member from [facility] scheduling department will contact you to schedule your tests.    You can also contact them directly at [phone].     For Diarrhea we recommend starting a prebiotic supplement called Banatrol plus which can be found on [employer]:    Begin taking 1 scoop of Banatrol plus with 4 ounces of water or juice twice a day until symptoms improve or after 7 days, then begin taking 1 scoop once daily.  Banatrol plus can also be mixed in oatmeal or applesauce  Do not take more than 6 scoops per day or more than 2 scoops at a single time  It is okay to use long-term  Banatrol plus works by solidifying stools through absorbing excess water in the colon while feeling the growth of good bacteria in your gut and helping to reduce inflammation.  This is also helpful in promoting bowel regularity.  Do not take if you allergic to latex or fruits bananas, avocados, passionfruit, plums, strawberries, and tomatoes   Additional information is also available on [employer] website (the manufacture):  https://www.[employer]/product/banatrol-plus/        For Fatty Liver Disease:    We recommend lifestyle modifications including Mediterranean diet.  Research has proven that reducing your body weight by at least 10% over 12 months has led to significant improvements in liver health  If safe to do so we recommend exercising for at least 150 minutes/week or increase your activity level by greater than 60  minutes/week  At this time there are unfortunately no proven medications to help improve fatty liver changes however managing your other health concerns such as lowering high cholesterol and blood sugars can reduce risk of future liver injury and prevent progression of liver disease.  We also recommend continued monitoring of liver function test with either our office or with primary care provider every 3 to 6 months for monitoring

## 2024-04-19 NOTE — PROGRESS NOTES
Chief Complaint   Patient presents with    Diarrhea           History of Present Illness    Patient is a 69 y.o. who presents to the office for follow up evaluation.  Last in office visit was on  7/11/2023 . Patient has a significant past medical history of elevated alkaline phosphatase heartburn and diarrhea- 7/6/22 stool testing negative for infectious etiology to diarrhea.     10/3/2023 EGD evaluation:    Esophageal mucosal changes suspicious for short-segment Narvaez's esophagus.   2 cm hiatal hernia.   Gastritis.   Normal examined duodenum.   The examination was otherwise normal.    Biopsies of small bowel without evidence of celiac disease, stomach antrum with mild chronic inflammation and focal acute inflammation along with evidence of focal erosion and atypical repair, negative for H. pylori esophageal sampling with chronic inflammation without evidence of intestinal metaplasia    -Due to atypical repair involving stomach antrum recommend follow-up EGD in 12 months due 10/4/2024    10/11/2022 Colonoscopy:     diverticulosis in the sigmoid and descending colon,   12 mm hyperplastic polyp at the hepatic flexure,   3 mm hyperplastic polyp in the descending colon, nonbleeding internal hemorrhoids.    Biopsies negative for microscopic colitis.    Recommend next colonoscopy for colon cancer screening in 3-5 years.     Reports only taking a antireflux supplement per functional medicine provider.  Discontinued pantoprazole.  Describes heartburn symptoms as well-controlled as long as she avoids late night snacking and eating.  Does report intermittent nausea without known cause without vomiting.  Denies dysphagia.    Bowel habits are described as occurring up to 3 times a day throughout the day without urgency, melena, or hematochezia.  Goal remains loose in consistency however denies steatorrhea.    No unintentional weight loss.    Patient denies known family history of colon polyps, colon cancer, or IBD.      "  Result Review :       UPPER GI ENDOSCOPY (10/03/2023 10:28)       Vital Signs:   /75   Pulse 83   Temp 96.5 °F (35.8 °C)   Ht 167.6 cm (65.98\")   Wt 76.2 kg (168 lb)   SpO2 98%   BMI 27.13 kg/m²     Body mass index is 27.13 kg/m².     Physical Exam  Vitals reviewed.   Constitutional:       General: She is not in acute distress.     Appearance: Normal appearance. She is well-developed. She is not diaphoretic.   HENT:      Head: Normocephalic and atraumatic.   Eyes:      General: No scleral icterus.  Cardiovascular:      Rate and Rhythm: Normal rate and regular rhythm.   Pulmonary:      Effort: Pulmonary effort is normal. No respiratory distress.      Breath sounds: Normal breath sounds.   Abdominal:      General: Bowel sounds are normal. There is no distension.      Palpations: Abdomen is soft. There is no mass.      Tenderness: There is no abdominal tenderness. There is no guarding or rebound.      Hernia: No hernia is present.   Skin:     General: Skin is warm and dry.      Coloration: Skin is not jaundiced.   Neurological:      Mental Status: She is alert and oriented to person, place, and time.   Psychiatric:         Mood and Affect: Mood normal.         Behavior: Behavior normal.         Thought Content: Thought content normal.         Judgment: Judgment normal.           Assessment and Plan    Diagnoses and all orders for this visit:    1. Narvaez's esophagus without dysplasia (Primary)    2. Gastroesophageal reflux disease with esophagitis without hemorrhage    3. Abnormal finding on liver function  -     MRI abdomen w wo contrast mrcp; Future  -     Hepatic Function Panel  -     Gamma GT  -     Anti-Smooth Muscle Antibody Titer  -     Anti-microsomal Antibody  -     Mitochondrial Antibodies, M2  -     MONI    4. Abnormal levels of other serum enzymes  -     Gamma GT           Discussion:    Patient presents for follow-up to discuss abnormal ASMA obtained by functional medicine provider as well as " management of diarrhea.  Review of labs noted a negative MONI as well as mitochondrial antibody and liver enzymes were within normal limits.    Discussed recommendations to proceed with MRCP evaluation to assess for evidence of PBC by way of ductal dilatation as well as reassess AMA and MONI with GGT for subsequent check and confirmation of testing.    Persistent loose stools, I have encouraged her to begin taking Banatrol over-the-counter supplementation which she can discontinue other probiotics while taking Banatrol.  She will provide update to our office after starting regimen.    For intermittent nausea, recommend gingerroot over-the-counter supplementation.  Discussed possible relation to untreated gastritis after discontinuation of pantoprazole.  She does not wish to restart pantoprazole at this time however will consider if other supplementation is not successful in improving symptoms.    Informed patient of next EGD for surveillance of gastritis with atypical repair due October 2024.    Patient is agreeable to the outlined above treatment plan.  Verbalizes understanding and will contact office for any new or worsening concerns.  All questions answered and support provided.        Patient Instructions   Blood work today     Once we receive these results, our office will contact you to discuss updating your treatment plan as indicated      For nausea, bloating and fullness     Isela has been helpful for some patients.  Gingerroot capsules can be purchased over-the-counter.    Directions: Isela root 500 mg (or 550 mg) capsules, take 1 to 2 capsules 3 times daily before meals, max 6 capsules per day.      Scheduling of your Ordered Diagnostic Tests MRI evaluation of liver/ pancreas :    A team member from [facility] scheduling department will contact you to schedule your tests.    You can also contact them directly at [phone].     For Diarrhea we recommend starting a prebiotic supplement  called Banatrol plus which can be found on [employer]:    Begin taking 1 scoop of Banatrol plus with 4 ounces of water or juice twice a day until symptoms improve or after 7 days, then begin taking 1 scoop once daily.  Banatrol plus can also be mixed in oatmeal or applesauce  Do not take more than 6 scoops per day or more than 2 scoops at a single time  It is okay to use long-term  Banatrol plus works by solidifying stools through absorbing excess water in the colon while feeling the growth of good bacteria in your gut and helping to reduce inflammation.  This is also helpful in promoting bowel regularity.  Do not take if you allergic to latex or fruits bananas, avocados, passionfruit, plums, strawberries, and tomatoes   Additional information is also available on [employer] website (the manufacture):  https://www.[employer]/product/banatrol-plus/        For Fatty Liver Disease:    We recommend lifestyle modifications including Mediterranean diet.  Research has proven that reducing your body weight by at least 10% over 12 months has led to significant improvements in liver health  If safe to do so we recommend exercising for at least 150 minutes/week or increase your activity level by greater than 60 minutes/week  At this time there are unfortunately no proven medications to help improve fatty liver changes however managing your other health concerns such as lowering high cholesterol and blood sugars can reduce risk of future liver injury and prevent progression of liver disease.  We also recommend continued monitoring of liver function test with either our office or with primary care provider every 3 to 6 months for monitoring          EMR Dragon/Transcription Disclaimer:  This document has been Dictated utilizing Dragon dictation.

## 2024-04-26 ENCOUNTER — HOSPITAL ENCOUNTER (OUTPATIENT)
Dept: MRI IMAGING | Facility: HOSPITAL | Age: 69
Discharge: HOME | End: 2024-04-26
Payer: MEDICARE

## 2024-04-26 DIAGNOSIS — R94.5 ABNORMAL FINDING ON LIVER FUNCTION: ICD-10-CM

## 2024-04-26 PROCEDURE — 0 GADOBENATE DIMEGLUMINE 529 MG/ML SOLUTION

## 2024-04-26 PROCEDURE — 82565 ASSAY OF CREATININE: CPT

## 2024-04-26 PROCEDURE — A9577 INJ MULTIHANCE: HCPCS

## 2024-04-26 PROCEDURE — 74183 MRI ABD W/O CNTR FLWD CNTR: CPT

## 2024-04-26 RX ADMIN — GADOBENATE DIMEGLUMINE 15 ML: 529 INJECTION, SOLUTION INTRAVENOUS at 13:18

## 2024-04-27 LAB — CREAT BLDA-MCNC: 0.9 MG/DL (ref 0.6–1.3)

## 2024-04-30 LAB
ALBUMIN SERPL-MCNC: 4.4 G/DL (ref 3.9–4.9)
ALP SERPL-CCNC: 99 IU/L (ref 44–121)
ALT SERPL-CCNC: 23 IU/L (ref 0–32)
ANA SER QL: NEGATIVE
AST SERPL-CCNC: 20 IU/L (ref 0–40)
BILIRUB DIRECT SERPL-MCNC: <0.1 MG/DL (ref 0–0.4)
BILIRUB SERPL-MCNC: 0.3 MG/DL (ref 0–1.2)
GGT SERPL-CCNC: 24 IU/L (ref 0–60)
LKM-1 AB SER-ACNC: 1.1 UNITS (ref 0–20)
MITOCHONDRIA M2 IGG SER-ACNC: <20 UNITS (ref 0–20)
PROT SERPL-MCNC: 7 G/DL (ref 6–8.5)
SMA IGG SER-ACNC: 38 UNITS (ref 0–19)

## 2024-08-14 ENCOUNTER — PREP FOR SURGERY (OUTPATIENT)
Dept: SURGERY | Facility: SURGERY CENTER | Age: 69
End: 2024-08-14
Payer: MEDICARE

## 2024-08-14 DIAGNOSIS — K22.70 BARRETT'S ESOPHAGUS WITHOUT DYSPLASIA: Primary | ICD-10-CM

## 2024-08-14 RX ORDER — SODIUM CHLORIDE 0.9 % (FLUSH) 0.9 %
10 SYRINGE (ML) INJECTION AS NEEDED
OUTPATIENT
Start: 2024-08-14

## 2024-08-14 RX ORDER — SODIUM CHLORIDE, SODIUM LACTATE, POTASSIUM CHLORIDE, CALCIUM CHLORIDE 600; 310; 30; 20 MG/100ML; MG/100ML; MG/100ML; MG/100ML
30 INJECTION, SOLUTION INTRAVENOUS CONTINUOUS PRN
OUTPATIENT
Start: 2024-08-14

## 2024-08-14 RX ORDER — SODIUM CHLORIDE 0.9 % (FLUSH) 0.9 %
3 SYRINGE (ML) INJECTION EVERY 12 HOURS SCHEDULED
OUTPATIENT
Start: 2024-08-14

## 2024-10-29 RX ORDER — CHLORAL HYDRATE 500 MG
CAPSULE ORAL
COMMUNITY

## 2024-10-29 RX ORDER — ASCORBIC ACID 500 MG
500 TABLET ORAL DAILY
COMMUNITY

## 2024-10-30 ENCOUNTER — ANESTHESIA (OUTPATIENT)
Dept: SURGERY | Facility: SURGERY CENTER | Age: 69
End: 2024-10-30
Payer: MEDICARE

## 2024-10-30 ENCOUNTER — ANESTHESIA EVENT (OUTPATIENT)
Dept: SURGERY | Facility: SURGERY CENTER | Age: 69
End: 2024-10-30
Payer: MEDICARE

## 2024-10-30 ENCOUNTER — HOSPITAL ENCOUNTER (OUTPATIENT)
Facility: SURGERY CENTER | Age: 69
Setting detail: HOSPITAL OUTPATIENT SURGERY
Discharge: HOME | End: 2024-10-30
Attending: INTERNAL MEDICINE | Admitting: INTERNAL MEDICINE
Payer: MEDICARE

## 2024-10-30 VITALS
TEMPERATURE: 98.4 F | HEIGHT: 66 IN | SYSTOLIC BLOOD PRESSURE: 120 MMHG | RESPIRATION RATE: 16 BRPM | HEART RATE: 87 BPM | BODY MASS INDEX: 27.55 KG/M2 | OXYGEN SATURATION: 95 % | DIASTOLIC BLOOD PRESSURE: 89 MMHG | WEIGHT: 171.4 LBS

## 2024-10-30 DIAGNOSIS — K22.70 BARRETT'S ESOPHAGUS WITHOUT DYSPLASIA: ICD-10-CM

## 2024-10-30 PROCEDURE — 25010000002 GLYCOPYRROLATE 1 MG/5ML SOLUTION: Performed by: NURSE ANESTHETIST, CERTIFIED REGISTERED

## 2024-10-30 PROCEDURE — 25010000002 LIDOCAINE PF 2% 2 % SOLUTION: Performed by: NURSE ANESTHETIST, CERTIFIED REGISTERED

## 2024-10-30 PROCEDURE — 43239 EGD BIOPSY SINGLE/MULTIPLE: CPT | Performed by: INTERNAL MEDICINE

## 2024-10-30 PROCEDURE — 25010000002 PROPOFOL 1000 MG/100ML EMULSION: Performed by: NURSE ANESTHETIST, CERTIFIED REGISTERED

## 2024-10-30 PROCEDURE — 25010000002 PROPOFOL 10 MG/ML EMULSION: Performed by: NURSE ANESTHETIST, CERTIFIED REGISTERED

## 2024-10-30 PROCEDURE — 88305 TISSUE EXAM BY PATHOLOGIST: CPT | Performed by: INTERNAL MEDICINE

## 2024-10-30 RX ORDER — CEPHALEXIN 500 MG/1
CAPSULE ORAL
Status: ON HOLD | COMMUNITY
Start: 2024-07-24 | End: 2024-10-30

## 2024-10-30 RX ORDER — NALTREXONE HYDROCHLORIDE 50 MG/1
TABLET, FILM COATED ORAL
Status: ON HOLD | COMMUNITY
Start: 2024-09-05 | End: 2024-10-30

## 2024-10-30 RX ORDER — SODIUM CHLORIDE, SODIUM LACTATE, POTASSIUM CHLORIDE, CALCIUM CHLORIDE 600; 310; 30; 20 MG/100ML; MG/100ML; MG/100ML; MG/100ML
30 INJECTION, SOLUTION INTRAVENOUS CONTINUOUS PRN
Status: DISCONTINUED | OUTPATIENT
Start: 2024-10-30 | End: 2024-10-30 | Stop reason: HOSPADM

## 2024-10-30 RX ORDER — SODIUM CHLORIDE 0.9 % (FLUSH) 0.9 %
10 SYRINGE (ML) INJECTION AS NEEDED
Status: DISCONTINUED | OUTPATIENT
Start: 2024-10-30 | End: 2024-10-30 | Stop reason: HOSPADM

## 2024-10-30 RX ORDER — PROPOFOL 10 MG/ML
INJECTION, EMULSION INTRAVENOUS AS NEEDED
Status: DISCONTINUED | OUTPATIENT
Start: 2024-10-30 | End: 2024-10-30 | Stop reason: SURG

## 2024-10-30 RX ORDER — SULFAMETHOXAZOLE AND TRIMETHOPRIM 800; 160 MG/1; MG/1
TABLET ORAL
Status: ON HOLD | COMMUNITY
Start: 2024-07-26 | End: 2024-10-30

## 2024-10-30 RX ORDER — PANTOPRAZOLE SODIUM 40 MG/1
40 TABLET, DELAYED RELEASE ORAL DAILY
Qty: 30 TABLET | Refills: 5 | Status: SHIPPED | OUTPATIENT
Start: 2024-10-30

## 2024-10-30 RX ORDER — SODIUM CHLORIDE 0.9 % (FLUSH) 0.9 %
3 SYRINGE (ML) INJECTION EVERY 12 HOURS SCHEDULED
Status: DISCONTINUED | OUTPATIENT
Start: 2024-10-30 | End: 2024-10-30 | Stop reason: HOSPADM

## 2024-10-30 RX ORDER — NIACIN 500 MG/1
1000 TABLET, EXTENDED RELEASE ORAL
Status: ON HOLD | COMMUNITY
Start: 2024-06-10 | End: 2024-10-30

## 2024-10-30 RX ORDER — HYDROXYZINE PAMOATE 25 MG/1
25 CAPSULE ORAL
Status: ON HOLD | COMMUNITY
Start: 2024-08-05 | End: 2024-10-30

## 2024-10-30 RX ORDER — LIDOCAINE HYDROCHLORIDE 20 MG/ML
INJECTION, SOLUTION EPIDURAL; INFILTRATION; INTRACAUDAL; PERINEURAL AS NEEDED
Status: DISCONTINUED | OUTPATIENT
Start: 2024-10-30 | End: 2024-10-30 | Stop reason: SURG

## 2024-10-30 RX ORDER — TERBINAFINE HYDROCHLORIDE 250 MG/1
TABLET ORAL
Status: ON HOLD | COMMUNITY
Start: 2024-08-20 | End: 2024-10-30

## 2024-10-30 RX ORDER — GLYCOPYRROLATE 0.2 MG/ML
INJECTION INTRAMUSCULAR; INTRAVENOUS AS NEEDED
Status: DISCONTINUED | OUTPATIENT
Start: 2024-10-30 | End: 2024-10-30 | Stop reason: SURG

## 2024-10-30 RX ADMIN — PROPOFOL 60 MG: 10 INJECTION, EMULSION INTRAVENOUS at 11:29

## 2024-10-30 RX ADMIN — GLYCOPYRROLATE 0.2 MG: 0.2 INJECTION, SOLUTION INTRAMUSCULAR; INTRAVENOUS at 11:27

## 2024-10-30 RX ADMIN — LIDOCAINE HYDROCHLORIDE 100 MG: 20 INJECTION, SOLUTION EPIDURAL; INFILTRATION; INTRACAUDAL; PERINEURAL at 11:27

## 2024-10-30 RX ADMIN — PROPOFOL 40 MG: 10 INJECTION, EMULSION INTRAVENOUS at 11:27

## 2024-10-30 RX ADMIN — PROPOFOL 200 MCG/KG/MIN: 10 INJECTION, EMULSION INTRAVENOUS at 11:27

## 2024-10-30 NOTE — H&P
No chief complaint on file.      HPI  barretts         Problem List:    Patient Active Problem List   Diagnosis    Degeneration of intervertebral disc of cervical region    Bilateral occipital neuralgia    Cough    Diarrhea    Gastroesophageal reflux disease    Encounter for screening for malignant neoplasm of colon    Narvaez's esophagus without dysplasia    Nausea       Medical History:    Past Medical History:   Diagnosis Date    Depression     Graves disease     Hyperlipidemia     Insomnia     PONV (postoperative nausea and vomiting)         Social History:    Social History     Socioeconomic History    Marital status:     Number of children: 3    Years of education: some college   Tobacco Use    Smoking status: Never    Smokeless tobacco: Never   Vaping Use    Vaping status: Never Used   Substance and Sexual Activity    Alcohol use: Yes     Comment: 1-2 per week    Drug use: No    Sexual activity: Defer     Birth control/protection: Post-menopausal       Family History:   Family History   Problem Relation Age of Onset    Stroke Mother     Cancer Father     Cancer Other     Colon cancer Neg Hx     Colon polyps Neg Hx     Crohn's disease Neg Hx     Irritable bowel syndrome Neg Hx     Ulcerative colitis Neg Hx        Surgical History:   Past Surgical History:   Procedure Laterality Date    COLONOSCOPY      COLONOSCOPY N/A 10/11/2022    Procedure: COLONOSCOPY FOR SCREENING;  Surgeon: Malik Rowe MD;  Location: [facility] MAIN OR;  Service: Gastroenterology;  Laterality: N/A;  diverticulosis hepatic flexure polyp hemorrhoids     ENDOSCOPY N/A 10/11/2022    Procedure: ESOPHAGOGASTRODUODENOSCOPY;  Surgeon: Malik Rowe MD;  Location: [facility] MAIN OR;  Service: Gastroenterology;  Laterality: N/A;  gastritis esophagitis     ENDOSCOPY N/A 10/3/2023    Procedure: ESOPHAGOGASTRODUODENOSCOPY;  Surgeon: Malik Rowe MD;  Location: [facility];  Service: Gastroenterology;  Laterality: N/A;  Hiatal  Hernia, Gastritis, Barretts Esophagus    KNEE SURGERY      NECK SURGERY      UPPER GASTROINTESTINAL ENDOSCOPY         No current facility-administered medications for this encounter.    Current Outpatient Medications:     amoxicillin (AMOXIL) 500 MG capsule, Take 1 capsule by mouth Daily As Needed. PROPHYLAXIS , DENTAL WORK, Disp: , Rfl:     ascorbic acid (VITAMIN C) 500 MG tablet, Take 1 tablet by mouth Daily., Disp: , Rfl:     Barberry-Oreg Grape-Goldenseal (BERBERINE COMPLEX PO), Take  by mouth., Disp: , Rfl:     Cholecalciferol (VITAMIN D) 2000 UNITS capsule, Take  by mouth., Disp: , Rfl:     desvenlafaxine (PRISTIQ) 100 MG 24 hr tablet, Take 50 mg by mouth Daily., Disp: , Rfl:     gabapentin (NEURONTIN) 300 MG capsule, , Disp: , Rfl:     levothyroxine (SYNTHROID, LEVOTHROID) 112 MCG tablet, Take 1 tablet by mouth Every Morning., Disp: , Rfl:     Nemolizumab-ilto 30 MG auto-injector, Inject  under the skin into the appropriate area as directed., Disp: , Rfl:     Omega-3 Fatty Acids (fish oil) 1000 MG capsule capsule, Take  by mouth Daily With Breakfast., Disp: , Rfl:     pantoprazole (PROTONIX) 40 MG EC tablet, Take 1 tablet by mouth Daily. (Patient not taking: Reported on 4/19/2024), Disp: , Rfl:     rosuvastatin (CRESTOR) 10 MG tablet, Take 1 tablet by mouth Daily. OFF FOR 2 WEEKS, Disp: , Rfl:     zolpidem (AMBIEN) 10 MG tablet, Take  by mouth. 0.5 TABLET A DAY, Disp: , Rfl:     Allergies:   Allergies   Allergen Reactions    Codeine Itching, Nausea And Vomiting and GI Intolerance        The following portions of the patient's history were reviewed by me and updated as appropriate: review of systems, allergies, current medications, past family history, past medical history, past social history, past surgical history and problem list.    There were no vitals filed for this visit.    PHYSICAL EXAM:    CONSTITUTIONAL:  today's vital signs reviewed by me  GASTROINTESTINAL: abdomen is soft nontender nondistended  with normal active bowel sounds, no masses are appreciated    Assessment/ Plan  Barretts    egd    Risks and benefits as well as alternatives to endoscopic evaluation were explained to the patient and they voiced understanding and wish to proceed.  These risks include but are not limited to the risk of bleeding, perforation, adverse reaction to sedation, and missed lesions.  The patient was given the opportunity to ask questions prior to the endoscopic procedure.

## 2024-10-30 NOTE — ANESTHESIA POSTPROCEDURE EVALUATION
"Patient: Marga Holcomb    Procedure Summary       Date: 10/30/24 Room / Location: [facility]  / [facility]    Anesthesia Start: 1124 Anesthesia Stop: 1139    Procedure: ESOPHAGOGASTRODUODENOSCOPY Diagnosis:       Narvaez's esophagus without dysplasia      (Narvaez's esophagus without dysplasia [K22.70])    Surgeons: Malik Rowe MD Provider: Breanne Atwood MD    Anesthesia Type: MAC ASA Status: 2            Anesthesia Type: MAC    Vitals  Vitals Value Taken Time   /89 10/30/24 1150   Temp 36.9 °C (98.4 °F) 10/30/24 1139   Pulse 87 10/30/24 1150   Resp 16 10/30/24 1150   SpO2 95 % 10/30/24 1150           Post Anesthesia Care and Evaluation    Patient location during evaluation: PHASE II  Patient participation: complete - patient participated  Level of consciousness: awake  Pain management: adequate    Airway patency: patent  Anesthetic complications: No anesthetic complications    Cardiovascular status: acceptable  Respiratory status: acceptable  Hydration status: acceptable    Comments: /89   Pulse 87   Temp 36.9 °C (98.4 °F)   Resp 16   Ht 167.6 cm (66\")   Wt 77.7 kg (171 lb 6.4 oz)   SpO2 95%   BMI 27.66 kg/m²         "

## 2024-10-30 NOTE — ANESTHESIA PREPROCEDURE EVALUATION
Anesthesia Evaluation     Patient summary reviewed   history of anesthetic complications:  PONV               Airway   Mallampati: II  Dental - normal exam     Comment: Risk of dental injury discussed with patient    Pulmonary - negative pulmonary ROS and normal exam   (-) not a smoker  Cardiovascular - normal exam    (+) hyperlipidemia  (-) hypertension      Neuro/Psych  (+) headaches, numbness, psychiatric history Depression  GI/Hepatic/Renal/Endo    (+) GERD, thyroid problem hypothyroidism    Musculoskeletal     Abdominal    Substance History      OB/GYN          Other   arthritis,                 Anesthesia Plan    ASA 2     MAC       Anesthetic plan, risks, benefits, and alternatives have been provided, discussed and informed consent has been obtained with: patient.    CODE STATUS:

## 2024-11-01 LAB
CYTO UR: NORMAL
LAB AP CASE REPORT: NORMAL
LAB AP CLINICAL INFORMATION: NORMAL
PATH REPORT.FINAL DX SPEC: NORMAL
PATH REPORT.GROSS SPEC: NORMAL

## 2025-06-17 ENCOUNTER — PRE-ADMISSION TESTING (OUTPATIENT)
Dept: PREADMISSION TESTING | Facility: HOSPITAL | Age: 70
End: 2025-06-17
Payer: MEDICARE

## 2025-06-17 VITALS
HEIGHT: 66 IN | RESPIRATION RATE: 16 BRPM | WEIGHT: 154 LBS | TEMPERATURE: 98.2 F | OXYGEN SATURATION: 97 % | DIASTOLIC BLOOD PRESSURE: 65 MMHG | SYSTOLIC BLOOD PRESSURE: 120 MMHG | BODY MASS INDEX: 24.75 KG/M2 | HEART RATE: 71 BPM

## 2025-06-17 LAB
ALBUMIN SERPL-MCNC: 4.3 G/DL (ref 3.5–5.2)
ALBUMIN/GLOB SERPL: 1.7 G/DL
ALP SERPL-CCNC: 95 U/L (ref 39–117)
ALT SERPL W P-5'-P-CCNC: 26 U/L (ref 1–33)
ANION GAP SERPL CALCULATED.3IONS-SCNC: 10.4 MMOL/L (ref 5–15)
AST SERPL-CCNC: 22 U/L (ref 1–32)
BASOPHILS # BLD AUTO: 0.03 10*3/MM3 (ref 0–0.2)
BASOPHILS NFR BLD AUTO: 0.4 % (ref 0–1.5)
BILIRUB SERPL-MCNC: 0.4 MG/DL (ref 0–1.2)
BUN SERPL-MCNC: 12 MG/DL (ref 8–23)
BUN/CREAT SERPL: 14 (ref 7–25)
CALCIUM SPEC-SCNC: 9.3 MG/DL (ref 8.6–10.5)
CHLORIDE SERPL-SCNC: 103 MMOL/L (ref 98–107)
CO2 SERPL-SCNC: 25.6 MMOL/L (ref 22–29)
CREAT SERPL-MCNC: 0.86 MG/DL (ref 0.57–1)
DEPRECATED RDW RBC AUTO: 42.1 FL (ref 37–54)
EGFRCR SERPLBLD CKD-EPI 2021: 72.3 ML/MIN/1.73
EOSINOPHIL # BLD AUTO: 0.09 10*3/MM3 (ref 0–0.4)
EOSINOPHIL NFR BLD AUTO: 1.3 % (ref 0.3–6.2)
ERYTHROCYTE [DISTWIDTH] IN BLOOD BY AUTOMATED COUNT: 13.5 % (ref 12.3–15.4)
GLOBULIN UR ELPH-MCNC: 2.5 GM/DL
GLUCOSE SERPL-MCNC: 93 MG/DL (ref 65–99)
HCT VFR BLD AUTO: 39.2 % (ref 34–46.6)
HGB BLD-MCNC: 12.6 G/DL (ref 12–15.9)
IMM GRANULOCYTES # BLD AUTO: 0.02 10*3/MM3 (ref 0–0.05)
IMM GRANULOCYTES NFR BLD AUTO: 0.3 % (ref 0–0.5)
LYMPHOCYTES # BLD AUTO: 1.95 10*3/MM3 (ref 0.7–3.1)
LYMPHOCYTES NFR BLD AUTO: 28.2 % (ref 19.6–45.3)
MCH RBC QN AUTO: 27.5 PG (ref 26.6–33)
MCHC RBC AUTO-ENTMCNC: 32.1 G/DL (ref 31.5–35.7)
MCV RBC AUTO: 85.4 FL (ref 79–97)
MONOCYTES # BLD AUTO: 0.62 10*3/MM3 (ref 0.1–0.9)
MONOCYTES NFR BLD AUTO: 9 % (ref 5–12)
NEUTROPHILS NFR BLD AUTO: 4.2 10*3/MM3 (ref 1.7–7)
NEUTROPHILS NFR BLD AUTO: 60.8 % (ref 42.7–76)
NRBC BLD AUTO-RTO: 0 /100 WBC (ref 0–0.2)
PLATELET # BLD AUTO: 160 10*3/MM3 (ref 140–450)
PMV BLD AUTO: 12.6 FL (ref 6–12)
POTASSIUM SERPL-SCNC: 4.2 MMOL/L (ref 3.5–5.2)
PROT SERPL-MCNC: 6.8 G/DL (ref 6–8.5)
QT INTERVAL: 425 MS
QTC INTERVAL: 442 MS
RBC # BLD AUTO: 4.59 10*6/MM3 (ref 3.77–5.28)
SODIUM SERPL-SCNC: 139 MMOL/L (ref 136–145)
WBC NRBC COR # BLD AUTO: 6.91 10*3/MM3 (ref 3.4–10.8)

## 2025-06-17 PROCEDURE — 93005 ELECTROCARDIOGRAM TRACING: CPT

## 2025-06-17 PROCEDURE — 80053 COMPREHEN METABOLIC PANEL: CPT

## 2025-06-17 PROCEDURE — 36415 COLL VENOUS BLD VENIPUNCTURE: CPT

## 2025-06-17 PROCEDURE — 85025 COMPLETE CBC W/AUTO DIFF WBC: CPT

## 2025-06-17 NOTE — DISCHARGE INSTRUCTIONS
Take the following medications the morning of surgery:      LEVOTHYROXINE, PRISTIQ, PANTOPRAZOLE    If you are on an Aspirin or a Blood Thinner please clarify with the surgeon and prescribing physician if and when you are to hold the medication or if you are to continue the medication.  If you are on prescription narcotic pain medication to control your pain you may also take that medication the morning of surgery.      General Instructions:     Do not eat solid food after midnight the night before surgery.  Clear liquids day of surgery are allowed but must be stopped at least two hours before your hospital arrival time.       Allowed clear liquids      Water, sodas, and tea or coffee with no cream or milk added.       12 to 20 ounces of a clear liquid that contains carbohydrates is recommended.  If non-diabetic, have Gatorade or Powerade.  If diabetic, have G2 or Powerade Zero.     Do not have liquids red in color.  Do not consume chicken, beef, pork or vegetable broth or bouillon cubes of any variety as they are not considered clear liquids and are not allowed.      Infants may have breast milk up to four hours before surgery.  Infants drinking formula may drink formula up to six hours before surgery.   Patients who avoid smoking, chewing tobacco and alcohol for 4 weeks prior to surgery have a reduced risk of post-operative complications.  Quit smoking as many days before surgery as you can.  Do not smoke, use chewing tobacco or drink alcohol the day of surgery.   If applicable bring your C-PAP/ BI-PAP machine in with you to preop day of surgery.  Bring any papers given to you in the doctor’s office.  Wear clean comfortable clothes.  Do not wear contact lenses, false eyelashes or make-up.  Bring a case for your glasses.   Bring crutches or walker if applicable.  Remove all piercings.  Leave jewelry and any other valuables at home.  Hair extensions with metal clips must be removed prior to surgery.  The  Pre-Admission Testing nurse will instruct you to bring medications if unable to obtain an accurate list in Pre-Admission Testing.    Day of surgery you will need to let the preoperative nurse know the last time you took each of your medications.  To ensure a safe environment for patients and staff, we kindly ask that children under the age of 16 not accompany patients.  If you must bring a dependent child or dependent adult please ensure a responsible adult, other than yourself, is present to supervise them.        Preventing a Surgical Site Infection:  For 2 to 3 days before surgery, avoid shaving with a razor because the razor can irritate skin and make it easier to develop an infection.    Any areas of open skin can increase the risk of a post-operative wound infection by allowing bacteria to enter and travel throughout the body.  Notify your surgeon if you have any skin wounds / rashes even if it is not near the expected surgical site.  The area will need assessed to determine if surgery should be delayed until it is healed.  The night prior to surgery shower using a fresh bar of anti-bacterial soap (such as Dial) and clean washcloth.  Sleep in a clean bed with clean clothing.  Do not allow pets to sleep with you.  Shower on the morning of surgery using a fresh bar of anti-bacterial soap (such as Dial) and clean washcloth.  Dry with a clean towel and dress in clean clothing.  Ask your surgeon if you will be receiving antibiotics prior to surgery.  Make sure you, your family, and all healthcare providers clean their hands with soap and water or an alcohol based hand  before caring for you or your wound.    Day of surgery:  Your arrival time is approximately two hours before your scheduled surgery time.  Please note if you have an early arrival time the surgery doors do not open before 5:00 AM.  Upon arrival, a Pre-op nurse and Anesthesiologist will review your health history, obtain vital signs, and  answer questions you may have.  The only belongings needed at this time will be a list of your home medications and if applicable your C-PAP/BI-PAP machine.  A Pre-op nurse will start an IV and you may receive medication in preparation for surgery, including something to help you relax.     Please be aware that surgery does come with discomfort.  We want to make every effort to control your discomfort so please discuss any uncontrolled symptoms with your nurse.   Your doctor will most likely have prescribed pain medications.      If you are going home after surgery you will receive individualized written care instructions before being discharged.  A responsible adult must drive you to and from the hospital on the day of your surgery and ideally stay with you through the night.   .  Discharge prescriptions can be filled by the hospital pharmacy during regular pharmacy hours.  If you are having surgery late in the day/evening your prescription may be e-prescribed to your pharmacy.  Please verify your pharmacy hours or chose a 24 hour pharmacy to avoid not having access to your prescription because your pharmacy has closed for the day.    If you are staying overnight following surgery, you will be transported to your hospital room following the recovery period.  [facility] has all private rooms.    If you have any questions please call Pre-Admission Testing at [phone].  Deductibles and co-payments are collected on the day of service. Please be prepared to pay the required co-pay, deductible or deposit on the day of service as defined by your plan.    Call your surgeon immediately if you experience any of the following symptoms:  Sore Throat  Shortness of Breath or difficulty breathing  Cough  Chills  Body soreness or muscle pain  Headache  Fever  New loss of taste or smell  Do not arrive for your surgery ill.  Your procedure will need to be rescheduled to another time.  You will need to call your  physician before the day of surgery to avoid any unnecessary exposure to hospital staff as well as other patients.

## 2025-06-30 ENCOUNTER — APPOINTMENT (OUTPATIENT)
Dept: GENERAL RADIOLOGY | Facility: HOSPITAL | Age: 70
End: 2025-06-30
Payer: MEDICARE

## 2025-06-30 ENCOUNTER — ANESTHESIA (OUTPATIENT)
Dept: PERIOP | Facility: HOSPITAL | Age: 70
End: 2025-06-30
Payer: MEDICARE

## 2025-06-30 ENCOUNTER — ANESTHESIA EVENT (OUTPATIENT)
Dept: PERIOP | Facility: HOSPITAL | Age: 70
End: 2025-06-30
Payer: MEDICARE

## 2025-06-30 ENCOUNTER — HOSPITAL ENCOUNTER (OUTPATIENT)
Facility: HOSPITAL | Age: 70
Setting detail: HOSPITAL OUTPATIENT SURGERY
Discharge: HOME | End: 2025-06-30
Attending: ORTHOPAEDIC SURGERY | Admitting: ORTHOPAEDIC SURGERY
Payer: MEDICARE

## 2025-06-30 VITALS
DIASTOLIC BLOOD PRESSURE: 75 MMHG | OXYGEN SATURATION: 96 % | HEART RATE: 67 BPM | TEMPERATURE: 97.5 F | RESPIRATION RATE: 12 BRPM | SYSTOLIC BLOOD PRESSURE: 135 MMHG

## 2025-06-30 DIAGNOSIS — M20.41 HAMMERTOE OF RIGHT FOOT: Primary | ICD-10-CM

## 2025-06-30 PROCEDURE — C1713 ANCHOR/SCREW BN/BN,TIS/BN: HCPCS | Performed by: ORTHOPAEDIC SURGERY

## 2025-06-30 PROCEDURE — 25010000002 PROPOFOL 200 MG/20ML EMULSION: Performed by: STUDENT IN AN ORGANIZED HEALTH CARE EDUCATION/TRAINING PROGRAM

## 2025-06-30 PROCEDURE — 25810000003 LACTATED RINGERS PER 1000 ML: Performed by: ANESTHESIOLOGY

## 2025-06-30 PROCEDURE — 25010000002 BUPIVACAINE (PF) 0.5 % SOLUTION: Performed by: ORTHOPAEDIC SURGERY

## 2025-06-30 PROCEDURE — 25010000002 DEXAMETHASONE SODIUM PHOSPHATE 20 MG/5ML SOLUTION: Performed by: STUDENT IN AN ORGANIZED HEALTH CARE EDUCATION/TRAINING PROGRAM

## 2025-06-30 PROCEDURE — 25010000002 PROPOFOL 10 MG/ML EMULSION: Performed by: STUDENT IN AN ORGANIZED HEALTH CARE EDUCATION/TRAINING PROGRAM

## 2025-06-30 PROCEDURE — 76000 FLUOROSCOPY <1 HR PHYS/QHP: CPT

## 2025-06-30 PROCEDURE — 25010000002 CEFAZOLIN PER 500 MG: Performed by: ORTHOPAEDIC SURGERY

## 2025-06-30 PROCEDURE — C1769 GUIDE WIRE: HCPCS | Performed by: ORTHOPAEDIC SURGERY

## 2025-06-30 PROCEDURE — 25010000002 FENTANYL CITRATE (PF) 50 MCG/ML SOLUTION: Performed by: STUDENT IN AN ORGANIZED HEALTH CARE EDUCATION/TRAINING PROGRAM

## 2025-06-30 PROCEDURE — 25010000002 FAMOTIDINE 10 MG/ML SOLUTION: Performed by: ANESTHESIOLOGY

## 2025-06-30 PROCEDURE — 25010000002 LIDOCAINE PF 2% 2 % SOLUTION: Performed by: STUDENT IN AN ORGANIZED HEALTH CARE EDUCATION/TRAINING PROGRAM

## 2025-06-30 PROCEDURE — 25010000002 MIDAZOLAM PER 1 MG: Performed by: ANESTHESIOLOGY

## 2025-06-30 PROCEDURE — 25010000002 ONDANSETRON PER 1 MG: Performed by: STUDENT IN AN ORGANIZED HEALTH CARE EDUCATION/TRAINING PROGRAM

## 2025-06-30 DEVICE — IMPLANTABLE DEVICE: Type: IMPLANTABLE DEVICE | Site: FOOT | Status: FUNCTIONAL

## 2025-06-30 RX ORDER — DEXAMETHASONE SODIUM PHOSPHATE 4 MG/ML
INJECTION, SOLUTION INTRA-ARTICULAR; INTRALESIONAL; INTRAMUSCULAR; INTRAVENOUS; SOFT TISSUE AS NEEDED
Status: DISCONTINUED | OUTPATIENT
Start: 2025-06-30 | End: 2025-06-30 | Stop reason: SURG

## 2025-06-30 RX ORDER — DROPERIDOL 2.5 MG/ML
0.62 INJECTION, SOLUTION INTRAMUSCULAR; INTRAVENOUS
Status: DISCONTINUED | OUTPATIENT
Start: 2025-06-30 | End: 2025-06-30 | Stop reason: HOSPADM

## 2025-06-30 RX ORDER — TIRZEPATIDE 7.5 MG/.5ML
7.5 INJECTION, SOLUTION SUBCUTANEOUS
COMMUNITY

## 2025-06-30 RX ORDER — HYDROCODONE BITARTRATE AND ACETAMINOPHEN 5; 325 MG/1; MG/1
1 TABLET ORAL ONCE AS NEEDED
Status: DISCONTINUED | OUTPATIENT
Start: 2025-06-30 | End: 2025-06-30 | Stop reason: HOSPADM

## 2025-06-30 RX ORDER — ATROPINE SULFATE 0.4 MG/ML
0.4 INJECTION, SOLUTION INTRAMUSCULAR; INTRAVENOUS; SUBCUTANEOUS ONCE AS NEEDED
Status: DISCONTINUED | OUTPATIENT
Start: 2025-06-30 | End: 2025-06-30 | Stop reason: HOSPADM

## 2025-06-30 RX ORDER — DIPHENHYDRAMINE HYDROCHLORIDE 50 MG/ML
12.5 INJECTION, SOLUTION INTRAMUSCULAR; INTRAVENOUS
Status: DISCONTINUED | OUTPATIENT
Start: 2025-06-30 | End: 2025-06-30 | Stop reason: HOSPADM

## 2025-06-30 RX ORDER — LIDOCAINE HYDROCHLORIDE 10 MG/ML
0.5 INJECTION, SOLUTION INFILTRATION; PERINEURAL ONCE AS NEEDED
Status: DISCONTINUED | OUTPATIENT
Start: 2025-06-30 | End: 2025-06-30 | Stop reason: HOSPADM

## 2025-06-30 RX ORDER — SODIUM CHLORIDE 0.9 % (FLUSH) 0.9 %
3-10 SYRINGE (ML) INJECTION AS NEEDED
Status: DISCONTINUED | OUTPATIENT
Start: 2025-06-30 | End: 2025-06-30 | Stop reason: HOSPADM

## 2025-06-30 RX ORDER — ONDANSETRON 2 MG/ML
4 INJECTION INTRAMUSCULAR; INTRAVENOUS ONCE AS NEEDED
Status: DISCONTINUED | OUTPATIENT
Start: 2025-06-30 | End: 2025-06-30 | Stop reason: HOSPADM

## 2025-06-30 RX ORDER — FENTANYL CITRATE 50 UG/ML
50 INJECTION, SOLUTION INTRAMUSCULAR; INTRAVENOUS
Status: DISCONTINUED | OUTPATIENT
Start: 2025-06-30 | End: 2025-06-30 | Stop reason: HOSPADM

## 2025-06-30 RX ORDER — PROPOFOL 10 MG/ML
INJECTION, EMULSION INTRAVENOUS AS NEEDED
Status: DISCONTINUED | OUTPATIENT
Start: 2025-06-30 | End: 2025-06-30 | Stop reason: SURG

## 2025-06-30 RX ORDER — MIDAZOLAM HYDROCHLORIDE 1 MG/ML
0.5 INJECTION, SOLUTION INTRAMUSCULAR; INTRAVENOUS
Status: COMPLETED | OUTPATIENT
Start: 2025-06-30 | End: 2025-06-30

## 2025-06-30 RX ORDER — NALOXONE HCL 0.4 MG/ML
0.2 VIAL (ML) INJECTION AS NEEDED
Status: DISCONTINUED | OUTPATIENT
Start: 2025-06-30 | End: 2025-06-30 | Stop reason: HOSPADM

## 2025-06-30 RX ORDER — SODIUM CHLORIDE, SODIUM LACTATE, POTASSIUM CHLORIDE, CALCIUM CHLORIDE 600; 310; 30; 20 MG/100ML; MG/100ML; MG/100ML; MG/100ML
9 INJECTION, SOLUTION INTRAVENOUS CONTINUOUS
Status: DISCONTINUED | OUTPATIENT
Start: 2025-06-30 | End: 2025-06-30 | Stop reason: HOSPADM

## 2025-06-30 RX ORDER — PROMETHAZINE HYDROCHLORIDE 25 MG/1
25 SUPPOSITORY RECTAL ONCE AS NEEDED
Status: DISCONTINUED | OUTPATIENT
Start: 2025-06-30 | End: 2025-06-30 | Stop reason: HOSPADM

## 2025-06-30 RX ORDER — PROMETHAZINE HYDROCHLORIDE 25 MG/1
25 TABLET ORAL ONCE AS NEEDED
Status: DISCONTINUED | OUTPATIENT
Start: 2025-06-30 | End: 2025-06-30 | Stop reason: HOSPADM

## 2025-06-30 RX ORDER — BUPIVACAINE HYDROCHLORIDE 5 MG/ML
INJECTION, SOLUTION EPIDURAL; INTRACAUDAL; PERINEURAL AS NEEDED
Status: DISCONTINUED | OUTPATIENT
Start: 2025-06-30 | End: 2025-06-30 | Stop reason: HOSPADM

## 2025-06-30 RX ORDER — FENTANYL CITRATE 50 UG/ML
INJECTION, SOLUTION INTRAMUSCULAR; INTRAVENOUS AS NEEDED
Status: DISCONTINUED | OUTPATIENT
Start: 2025-06-30 | End: 2025-06-30 | Stop reason: SURG

## 2025-06-30 RX ORDER — IPRATROPIUM BROMIDE AND ALBUTEROL SULFATE 2.5; .5 MG/3ML; MG/3ML
3 SOLUTION RESPIRATORY (INHALATION) ONCE AS NEEDED
Status: DISCONTINUED | OUTPATIENT
Start: 2025-06-30 | End: 2025-06-30 | Stop reason: HOSPADM

## 2025-06-30 RX ORDER — HYDROMORPHONE HYDROCHLORIDE 1 MG/ML
0.5 INJECTION, SOLUTION INTRAMUSCULAR; INTRAVENOUS; SUBCUTANEOUS
Status: DISCONTINUED | OUTPATIENT
Start: 2025-06-30 | End: 2025-06-30 | Stop reason: HOSPADM

## 2025-06-30 RX ORDER — LIDOCAINE HYDROCHLORIDE 20 MG/ML
INJECTION, SOLUTION EPIDURAL; INFILTRATION; INTRACAUDAL; PERINEURAL AS NEEDED
Status: DISCONTINUED | OUTPATIENT
Start: 2025-06-30 | End: 2025-06-30 | Stop reason: SURG

## 2025-06-30 RX ORDER — LABETALOL HYDROCHLORIDE 5 MG/ML
5 INJECTION, SOLUTION INTRAVENOUS
Status: DISCONTINUED | OUTPATIENT
Start: 2025-06-30 | End: 2025-06-30 | Stop reason: HOSPADM

## 2025-06-30 RX ORDER — FAMOTIDINE 10 MG/ML
20 INJECTION, SOLUTION INTRAVENOUS ONCE
Status: COMPLETED | OUTPATIENT
Start: 2025-06-30 | End: 2025-06-30

## 2025-06-30 RX ORDER — OXYCODONE AND ACETAMINOPHEN 7.5; 325 MG/1; MG/1
1 TABLET ORAL EVERY 4 HOURS PRN
Status: DISCONTINUED | OUTPATIENT
Start: 2025-06-30 | End: 2025-06-30 | Stop reason: HOSPADM

## 2025-06-30 RX ORDER — HYDROCODONE BITARTRATE AND ACETAMINOPHEN 5; 325 MG/1; MG/1
1-2 TABLET ORAL EVERY 6 HOURS PRN
Qty: 20 TABLET | Refills: 0 | Status: SHIPPED | OUTPATIENT
Start: 2025-06-30

## 2025-06-30 RX ORDER — ONDANSETRON 2 MG/ML
INJECTION INTRAMUSCULAR; INTRAVENOUS AS NEEDED
Status: DISCONTINUED | OUTPATIENT
Start: 2025-06-30 | End: 2025-06-30 | Stop reason: SURG

## 2025-06-30 RX ORDER — FLUMAZENIL 0.1 MG/ML
0.2 INJECTION INTRAVENOUS AS NEEDED
Status: DISCONTINUED | OUTPATIENT
Start: 2025-06-30 | End: 2025-06-30 | Stop reason: HOSPADM

## 2025-06-30 RX ORDER — SODIUM CHLORIDE 0.9 % (FLUSH) 0.9 %
3 SYRINGE (ML) INJECTION EVERY 12 HOURS SCHEDULED
Status: DISCONTINUED | OUTPATIENT
Start: 2025-06-30 | End: 2025-06-30 | Stop reason: HOSPADM

## 2025-06-30 RX ORDER — HYDRALAZINE HYDROCHLORIDE 20 MG/ML
5 INJECTION INTRAMUSCULAR; INTRAVENOUS
Status: DISCONTINUED | OUTPATIENT
Start: 2025-06-30 | End: 2025-06-30 | Stop reason: HOSPADM

## 2025-06-30 RX ORDER — EPHEDRINE SULFATE 50 MG/ML
5 INJECTION, SOLUTION INTRAVENOUS ONCE AS NEEDED
Status: DISCONTINUED | OUTPATIENT
Start: 2025-06-30 | End: 2025-06-30 | Stop reason: HOSPADM

## 2025-06-30 RX ORDER — ONDANSETRON 4 MG/1
4 TABLET, FILM COATED ORAL EVERY 8 HOURS PRN
Qty: 20 TABLET | Refills: 0 | Status: SHIPPED | OUTPATIENT
Start: 2025-06-30 | End: 2025-07-30

## 2025-06-30 RX ADMIN — MIDAZOLAM 0.5 MG: 1 INJECTION INTRAMUSCULAR; INTRAVENOUS at 14:19

## 2025-06-30 RX ADMIN — CEFAZOLIN 2000 MG: 2 INJECTION, POWDER, FOR SOLUTION INTRAMUSCULAR; INTRAVENOUS at 14:45

## 2025-06-30 RX ADMIN — PROPOFOL 150 MCG/KG/MIN: 10 INJECTION, EMULSION INTRAVENOUS at 14:52

## 2025-06-30 RX ADMIN — MIDAZOLAM 0.5 MG: 1 INJECTION INTRAMUSCULAR; INTRAVENOUS at 14:04

## 2025-06-30 RX ADMIN — ONDANSETRON 4 MG: 2 INJECTION, SOLUTION INTRAMUSCULAR; INTRAVENOUS at 15:12

## 2025-06-30 RX ADMIN — FENTANYL CITRATE 50 MCG: 50 INJECTION, SOLUTION INTRAMUSCULAR; INTRAVENOUS at 14:58

## 2025-06-30 RX ADMIN — PROPOFOL 50 MG: 10 INJECTION, EMULSION INTRAVENOUS at 14:59

## 2025-06-30 RX ADMIN — SODIUM CHLORIDE, POTASSIUM CHLORIDE, SODIUM LACTATE AND CALCIUM CHLORIDE 9 ML/HR: 600; 310; 30; 20 INJECTION, SOLUTION INTRAVENOUS at 14:04

## 2025-06-30 RX ADMIN — LIDOCAINE HYDROCHLORIDE 60 MG: 20 INJECTION, SOLUTION EPIDURAL; INFILTRATION; INTRACAUDAL; PERINEURAL at 14:52

## 2025-06-30 RX ADMIN — PROPOFOL 100 MG: 10 INJECTION, EMULSION INTRAVENOUS at 14:52

## 2025-06-30 RX ADMIN — DEXAMETHASONE SODIUM PHOSPHATE 8 MG: 4 INJECTION, SOLUTION INTRAMUSCULAR; INTRAVENOUS at 15:12

## 2025-06-30 RX ADMIN — FAMOTIDINE 20 MG: 10 INJECTION INTRAVENOUS at 14:04

## 2025-06-30 NOTE — H&P
[facility]   HISTORY AND PHYSICAL    Patient Name: Marga Holcomb  : 1955  MRN: [id number]  Primary Care Physician:  Orlin Daly DO  Date of admission: 2025    Subjective   Subjective     Chief Complaint: Right forefoot pain    History of Present Illness  The patient is a pleasant 70-year-old female who presents today for elective revision right third hammertoe correction/PIP arthrodesis.  Please refer to office H&P for full details.  No significant change in history, exam, plan.    Review of Systems   Review of Systems:  Constitutional: Negative  HENT: Negative  Eyes: Negative  Respiratory: Negative; no shortness of breath  Cardiovascular: Negative; no current chest pain  Gastrointestinal: Negative  : Negative  Skin: Negative except as listed in HPI  Neurological: Negative, no numbness or deficits  Hematological: Negative  Muscoloskeletal: Per HPI                 Personal History     Past Medical History:   Diagnosis Date    Depression     GERD (gastroesophageal reflux disease)     Graves disease     Hammertoe of right foot     3RD TOE    Hyperlipidemia     Insomnia     PONV (postoperative nausea and vomiting)     SEVERE    Prurigo nodularis     Sleep apnea     CPAP       Past Surgical History:   Procedure Laterality Date    COLONOSCOPY      COLONOSCOPY N/A 10/11/2022    Procedure: COLONOSCOPY FOR SCREENING;  Surgeon: Malik Rowe MD;  Location: [facility] [place] MAIN OR;  Service: Gastroenterology;  Laterality: N/A;  diverticulosis hepatic flexure polyp hemorrhoids     ENDOSCOPY N/A 10/11/2022    Procedure: ESOPHAGOGASTRODUODENOSCOPY;  Surgeon: Malik Rowe MD;  Location: [facility] [place] MAIN OR;  Service: Gastroenterology;  Laterality: N/A;  gastritis esophagitis     ENDOSCOPY N/A 10/3/2023    Procedure: ESOPHAGOGASTRODUODENOSCOPY;  Surgeon: Malik Rowe MD;  Location: [facility] [place] MAIN OR;  Service: Gastroenterology;  Laterality: N/A;  Hiatal Hernia, Gastritis, Barretts Esophagus    ENDOSCOPY N/A  10/30/2024    Procedure: ESOPHAGOGASTRODUODENOSCOPY;  Surgeon: Malik Rowe MD;  [place] [facility] [place];  Service: Gastroenterology;  Laterality: N/A;  esophagitis, gastritis    KNEE SURGERY      NECK SURGERY      UPPER GASTROINTESTINAL ENDOSCOPY         Family History: family history includes Cancer in her father and another family member; Stroke in her mother. Otherwise pertinent FHx was reviewed and not pertinent to current issue.    Social History:  reports that she has never smoked. She has never used smokeless tobacco. She reports current alcohol use. She reports that she does not use drugs.    Home Medications:  Barberry-Oreg Grape-Goldenseal, DHEA, Mag Aspart-Potassium Aspart, Nemolizumab-ilto, Tirzepatide, Vitamin D, amoxicillin, ascorbic acid, desvenlafaxine, fish oil, gabapentin, levothyroxine, pantoprazole, rosuvastatin, and zolpidem    Allergies:  Allergies   Allergen Reactions    Codeine Itching, Nausea And Vomiting and GI Intolerance       Objective    Objective     Vitals:   Temp:  [97.4 °F (36.3 °C)] 97.4 °F (36.3 °C)  Heart Rate:  [72] 72  BP: (118)/(64) 118/64    Physical Exam    Physical Exam:  Vital signs reviewed.  Constitutional:  Appears well-developed, well nourished.  Cardiovascular: Regular rate.  Pulmonary: Effort normal, symmetric chest expansion, no labored breathing.  Neurological: No gross deficits, oriented to person, place, and time.  Skin: Warm and dry  Psychiatric: Normal mood and affect  Musculoskeletal:    Right third toe with a healed incision over the dorsal aspect of the PIP joint.  Mild recurrent hammertoe deformity.  Trace swelling throughout the toe.  Tenderness at the PIP joint.     Active ankle dorsiflexion and plantarflexion.  Sensation is intact to light touch.  Toes are warm and well perfused with brisk capillary refill.         Assessment & Plan   Assessment / Plan     The patient is a pleasant 70-year-old female who presents today for elective revision right  third hammertoe correction/PIP arthrodesis.  Please refer to office H&P for full details.  No significant change in history, exam, plan.    The patient has a fractured Ossio implant.    We discussed we would attempt to revise the PIP arthrodesis with retrograde screw.  She understands there is risk of persistent nonunion, pain, swelling, etc.      The risks/benefits of surgery, including pain, infection, wound healing problems, need for future procedures, mal/nonunion, DVT/PE, nerve or vessel injury, cardiac event, and/or death were discussed, and the patient elected to proceed with surgery.      Orlin Castellano Jr, MD

## 2025-06-30 NOTE — OP NOTE
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       Post-Op Diagnosis Codes:  Same    Procedure(s):  Revision right third hammertoe correction/proximal interphalange joint arthrodesis (85448)      Surgeon(s):  Orlin Castellano Jr., MD    Assistants:  None    Anesthesia: LMA with digital block    Estimated Blood Loss: minimal    Specimens:                None      Drains: * No LDAs found *    Complications:   None apparent    Disposition:  Stable to PACU for cover    Indications for procedure:  The patient is a 70-year-old female who undergone a prior right third hammertoe reconstruction by another provider using an intramedullary absorbable implant.  Unfortunately, she developed progressive pain and recurrent deformity.  Imaging showed nonunion at the PIP joint and clinical exam showed recurrent hammertoe deformity.  The patient's symptoms were refractory to prolonged conservative management.  She requested surgical revision.  The above listed procedures were recommended.  The risks/benefits of surgery, including pain, infection, wound healing problems, need for future procedures, mal/nonunion, DVT/PE, nerve or vessel injury, cardiac event, and/or death were discussed, and the patient elected to proceed with surgery.      Procedure in detail:  The correct patient was identified in preoperative holding.  All risks and benefits of surgery were again discussed in detail, and the patient agreed to proceed with surgery.  The operative extremity was confirmed and marked by myself.  Operative consent reviewed and confirmed to be signed by the patient and myself.    At this time, the patient was wheeled to the operative theatre and placed supine on the OR table.  RAKESH/SCD placed on nonoperative leg. Anesthesia was induced smoothly by our anesthesia colleagues.   The right lower extremity was  prepped and draped in standard sterile fashion.  Appropriate presurgical timeout was performed, confirming correct patient, correct extremity, correct procedure, availability of sterile instruments/implants, and the administration of intravenous antibiotics in the form of Ancef within one hour of skin incision.    At this time, an elliptical incision was made over the contracted third toe PIP joint.  Full-thickness flaps were raised, collaterals released.  Neurovascular structures protected.  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  Deep retractors were placed.  The distal aspect of the proximal phalanx was resected with a microsagittal saw, taking just enough bone to reveal bleeding healthy bone proximal to the nonunion site while minimizing shortening of the digit.  The base of the middle phalanx was carefully prepared with a curette and rongeur, removing fibrous nonunion material down to healthy bleeding bone.    At this time, a guidewire from the Medline 3.0 mm headless partially-threaded screw set was placed across the joint in retrograde fashion.  Ideal wire placement was confirmed on multiple views of fluoroscopy.  This was measured, drilled, and appropriate length screw placed with excellent compression and maintained alignment at the PIP revision arthrodesis site.    The forefoot was examined clinically and fluoroscopically.  All deformity been corrected.  All hardware remained in appropriate position and of appropriate length with good apposition at the PIP arthrodesis site.    Incisions were irrigated and closed with 3-0 nylon.  Esmarch tourniquet released.  Brisk cap refill returned to all toes.  Patient awoken from anesthesia without apparent complication and taken to PACU for recovery.            Orlin Castellano Jr, MD     Date: 6/30/2025  Time: 17:13 EDT

## 2025-06-30 NOTE — ANESTHESIA PREPROCEDURE EVALUATION
Anesthesia Evaluation     Patient summary reviewed and Nursing notes reviewed   history of anesthetic complications:  PONV  NPO Solid Status: > 8 hours  NPO Liquid Status: > 2 hours           Airway   Mallampati: II  TM distance: >3 FB  Neck ROM: full  No difficulty expected  Dental      Pulmonary    (+) ,sleep apnea on CPAP  Cardiovascular     ECG reviewed    (+) hyperlipidemia      Neuro/Psych  (+) headaches, numbness, psychiatric history (depression) Depression  GI/Hepatic/Renal/Endo    (+) GERD, thyroid problem (grave's)     Musculoskeletal     Abdominal    Substance History      OB/GYN          Other                      Anesthesia Plan    ASA 2     MAC     (MAC with local infiltration by surgeon    I have reviewed the patient's history and chart with the patient, including all pertinent laboratory results and imaging. I have explained the risks of anesthesia including but not limited to dental damage, corneal abrasion, nerve injury, MI, stroke, aspiration, and death. Patient has agreed to proceed.      /64   Pulse 72   Temp 36.3 °C (97.4 °F) (Oral)   SpO2 99%   )  intravenous induction     Anesthetic plan, risks, benefits, and alternatives have been provided, discussed and informed consent has been obtained with: patient.      CODE STATUS:

## 2025-06-30 NOTE — ANESTHESIA POSTPROCEDURE EVALUATION
Patient: Marga Holcomb    Procedure Summary       Date: 06/30/25 Room / Location:  [facility] [address] [place] [address] [facility]    Anesthesia Start: 1449 Anesthesia Stop: 1550    Procedure: RIGHT FOOT REVISION THIRD HAMMER TOE RECONSTRUCTION (Right: Foot) Diagnosis:     Surgeons: Orlin Castellano Jr., MD Provider: Yuniel Sears MD    Anesthesia Type: MAC ASA Status: 2            Anesthesia Type: MAC    Vitals  Vitals Value Taken Time   /81 06/30/25 16:30   Temp 36.4 °C (97.5 °F) 06/30/25 16:15   Pulse 69 06/30/25 16:36   Resp 12 06/30/25 16:00   SpO2 97 % 06/30/25 16:36   Vitals shown include unfiled device data.        Post Anesthesia Care and Evaluation    Patient location during evaluation: bedside  Patient participation: complete - patient participated  Level of consciousness: awake and alert  Pain management: adequate    Airway patency: patent  Anesthetic complications: No anesthetic complications    Cardiovascular status: acceptable  Respiratory status: acceptable  Hydration status: acceptable    Comments: /75   Pulse 67   Temp 36.4 °C (97.5 °F) (Oral)   Resp 12   SpO2 96%

## 2025-07-21 ENCOUNTER — TELEPHONE (OUTPATIENT)
Dept: GASTROENTEROLOGY | Facility: CLINIC | Age: 70
End: 2025-07-21
Payer: MEDICARE

## 2025-07-30 RX ORDER — PANTOPRAZOLE SODIUM 40 MG/1
40 TABLET, DELAYED RELEASE ORAL DAILY
Qty: 30 TABLET | Refills: 5 | Status: CANCELLED | OUTPATIENT
Start: 2025-07-30

## 2025-08-01 RX ORDER — PANTOPRAZOLE SODIUM 40 MG/1
40 TABLET, DELAYED RELEASE ORAL DAILY
Qty: 30 TABLET | Refills: 5 | Status: SHIPPED | OUTPATIENT
Start: 2025-08-01

## (undated) DEVICE — SINGLE-USE BIOPSY FORCEPS: Brand: RADIAL JAW 4

## (undated) DEVICE — TRAP FLD MINIVAC MEGADYNE 100ML

## (undated) DEVICE — CANN NASL CO2 TRULINK W/O2 A/

## (undated) DEVICE — MSK ENDO PORT O2 POM ELITE CURAPLEX A/

## (undated) DEVICE — Device

## (undated) DEVICE — BITEBLOCK OMNI BLOC

## (undated) DEVICE — SUT ETHLN 3/0 PC5 18IN 1893G

## (undated) DEVICE — KT DRP MINIVIEW STRL

## (undated) DEVICE — MICRO SAGITTAL BLADE OFFSET (5.5 X 0.38 X 29.6MM)

## (undated) DEVICE — FLEX ADVANTAGE 1500CC: Brand: FLEX ADVANTAGE

## (undated) DEVICE — BNDG,ELSTC,MATRIX,STRL,4"X5YD,LF,HOOK&LP: Brand: MEDLINE

## (undated) DEVICE — GLV SURG PREMIERPRO ORTHO LTX PF SZ8 BRN

## (undated) DEVICE — PENCL SMOKE/EVAC MEGADYNE TELESCP 10FT

## (undated) DEVICE — GUIDEWIRE, NON-THREADED, 1.1 X 150MM: Brand: PENDING

## (undated) DEVICE — SUT VIC 3/0 CT2 27IN J232H

## (undated) DEVICE — GOWN PROC ENDOARMOR GI LVL3 HY/SHLD UNIV

## (undated) DEVICE — GLV SURG SIGNATURE ESSENTIAL PF LTX SZ8

## (undated) DEVICE — SOL ISO/ALC 70PCT 4OZ

## (undated) DEVICE — KT ORCA ORCAPOD DISP STRL

## (undated) DEVICE — GOWN,SIRUS,POLYRNF,BRTHSLV,XLN/XL,20/CS: Brand: MEDLINE

## (undated) DEVICE — GLV SURG BIOGEL LTX PF 8

## (undated) DEVICE — BLCK/BITE BLOX W/DENTL/RIM W/STRAP 54F

## (undated) DEVICE — UNDERCAST PADDING: Brand: DEROYAL

## (undated) DEVICE — VIAL FORMLN CAP 10PCT 20ML

## (undated) DEVICE — DRESSING,GAUZE,XEROFORM,CURAD,1"X8",ST: Brand: CURAD

## (undated) DEVICE — PATIENT RETURN ELECTRODE, SINGLE-USE, CONTACT QUALITY MONITORING, ADULT, WITH 9FT CORD, FOR PATIENTS WEIGING OVER 33LBS. (15KG): Brand: MEGADYNE

## (undated) DEVICE — BANDAGE,GAUZE,CONFORMING,4"X75",STRL,LF: Brand: MEDLINE

## (undated) DEVICE — ADAPT CLN SCPE ENDO PORPOISE BX/50 DISP

## (undated) DEVICE — DRAPE,U/ SHT,SPLIT,PLAS,STERIL: Brand: MEDLINE

## (undated) DEVICE — APPL CHLORAPREP HI/LITE 26ML ORNG

## (undated) DEVICE — TRAP SPECI SUCTIONPOLYPTRAP 4/CHMBR

## (undated) DEVICE — SPNG GZ WOVN 4X4IN 12PLY 10/BX STRL

## (undated) DEVICE — STOCKINETTE,IMPERVIOUS,12X48,STERILE: Brand: MEDLINE

## (undated) DEVICE — PK ORTHO MINOR TOWER 40